# Patient Record
Sex: MALE | Race: BLACK OR AFRICAN AMERICAN | Employment: PART TIME | URBAN - METROPOLITAN AREA
[De-identification: names, ages, dates, MRNs, and addresses within clinical notes are randomized per-mention and may not be internally consistent; named-entity substitution may affect disease eponyms.]

---

## 2017-05-08 ENCOUNTER — OFFICE VISIT (OUTPATIENT)
Dept: ORTHOPEDIC SURGERY | Age: 22
End: 2017-05-08

## 2017-05-08 VITALS
SYSTOLIC BLOOD PRESSURE: 139 MMHG | TEMPERATURE: 96.6 F | WEIGHT: 166.8 LBS | BODY MASS INDEX: 23.35 KG/M2 | DIASTOLIC BLOOD PRESSURE: 84 MMHG | HEIGHT: 71 IN | HEART RATE: 85 BPM

## 2017-05-08 DIAGNOSIS — M25.562 ACUTE PAIN OF BOTH KNEES: Primary | ICD-10-CM

## 2017-05-08 DIAGNOSIS — M25.561 ACUTE PAIN OF BOTH KNEES: Primary | ICD-10-CM

## 2017-05-08 NOTE — PROGRESS NOTES
Patient: Kushal Marks                MRN: [id number]       SSN: [id number]  YOB: 1995        AGE: 23 y.o. SEX: male  There is no height or weight on file to calculate BMI. PCP: No primary care provider on file. 05/08/17      No chief complaint on file. HISTORY OF PRESENT ILLNESS: Kushal Marks is a 23 y.o. male who presents to the office for acute onset of bilateral knee pain while running during a PFT in [employer]. He had no problems prior to boot camp. He denied any swelling or locking of either knee. He still has pain despite being home form [employer] on leave. Review of Systems   Constitutional: Negative. HENT: Negative. Eyes: Negative. Respiratory: Negative. Cardiovascular: Negative. Gastrointestinal: Negative. Genitourinary: Negative. Musculoskeletal: Positive for joint pain (bilateral knees). Skin: Negative. Neurological: Negative. Endo/Heme/Allergies: Negative. Psychiatric/Behavioral: Negative. Social History     Social History    Marital status: SINGLE     Spouse name: N/A    Number of children: N/A    Years of education: N/A     Occupational History    Not on file. Social History Main Topics    Smoking status: Not on file    Smokeless tobacco: Not on file    Alcohol use Not on file    Drug use: Not on file    Sexual activity: Not on file     Other Topics Concern    Not on file     Social History Narrative        No past medical history on file. Allergies not on file      No current outpatient prescriptions on file. No current facility-administered medications for this visit. Physical Exam  Constitutional: he is oriented to person, place, and time and well-developed, well-nourished, and in no distress. No distress. HENT:   Head: Normocephalic and atraumatic.    Right Ear: Hearing normal.   Left Ear: Hearing normal.   Nose: Nose normal.   Eyes: Conjunctivae, EOM and lids are normal. Pupils are equal, round, and reactive to light. Neck: Trachea normal.   Pulmonary/Chest: Effort normal and breath sounds normal. No respiratory distress. Abdominal: Soft. Neurological: he is alert and oriented to person, place, and time. Skin: Skin is warm, dry and intact. he is not diaphoretic. Psychiatric: Affect normal.   Nursing note and vitals reviewed. Ortho Exam   Show no swelling, effusion or increased warmth. Ligaments were stable when tested but caused pain in both knee when tested. Performance of a DKB showed pain but no crepitus of either patellar femoral joint. RADIOGRAPHS:   No new XR's taken today. IMPRESSION & PLAN: The pt may have bone contusions from running. Since NSIADS (ibuprofen) have not helped, we will have him undergo an MRI to see if there is any internal deranagment. I will see him back after the results of the MRI. Written by Anthony Santiago, as dictated by Dr. Akin Hernández, Dr. Amy Jason, confirm that all documentation is accurate.

## 2017-05-16 ENCOUNTER — HOSPITAL ENCOUNTER (OUTPATIENT)
Age: 22
Discharge: HOME | End: 2017-05-16
Attending: ORTHOPAEDIC SURGERY
Payer: OTHER GOVERNMENT

## 2017-05-16 DIAGNOSIS — M25.561 ACUTE PAIN OF BOTH KNEES: ICD-10-CM

## 2017-05-16 DIAGNOSIS — M25.562 ACUTE PAIN OF BOTH KNEES: ICD-10-CM

## 2017-05-16 PROCEDURE — 73721 MRI JNT OF LWR EXTRE W/O DYE: CPT

## 2017-06-26 ENCOUNTER — OFFICE VISIT (OUTPATIENT)
Dept: ORTHOPEDIC SURGERY | Age: 22
End: 2017-06-26

## 2017-06-26 VITALS
HEIGHT: 71 IN | TEMPERATURE: 97.5 F | DIASTOLIC BLOOD PRESSURE: 76 MMHG | SYSTOLIC BLOOD PRESSURE: 123 MMHG | HEART RATE: 84 BPM | BODY MASS INDEX: 22.4 KG/M2 | WEIGHT: 160 LBS

## 2017-06-26 DIAGNOSIS — M22.2X2 PATELLOFEMORAL SYNDROME OF BOTH KNEES: ICD-10-CM

## 2017-06-26 DIAGNOSIS — M22.2X1 PATELLOFEMORAL SYNDROME OF BOTH KNEES: ICD-10-CM

## 2017-06-26 DIAGNOSIS — M25.561 ACUTE PAIN OF BOTH KNEES: Primary | ICD-10-CM

## 2017-06-26 DIAGNOSIS — M25.562 ACUTE PAIN OF BOTH KNEES: Primary | ICD-10-CM

## 2017-06-26 RX ORDER — HYDROXYZINE 50 MG/1
50 TABLET, FILM COATED ORAL
COMMUNITY
End: 2019-05-31 | Stop reason: ALTCHOICE

## 2017-06-26 NOTE — PROGRESS NOTES
Patient: Vinny Hammond                MRN: [id number]       SSN: [id number]  YOB: 1995        AGE: 23 y.o. SEX: male  There is no height or weight on file to calculate BMI. PCP: PROVIDER UNKNOWN  06/26/17      Chief Complaint   Patient presents with    Knee Pain     Bilateral       HISTORY OF PRESENT ILLNESS: Vinny Hammond is a 23 y.o. male who presents to the office for has completed the MRI of both knees. The MRI report was consistent of patellofemoral friction syndrome which I would interpret as patellofemoral syndrome. In short there was no major structural abnormality in either right or left knee. The pt states the pain is most prominent with long running or exercises involving the LE's. Review of Systems   Constitutional: Negative. HENT: Negative. Eyes: Negative. Respiratory: Negative. Cardiovascular: Negative. Gastrointestinal: Negative. Genitourinary: Negative. Musculoskeletal: Positive for joint pain (bilateral knees). Skin: Negative. Neurological: Negative. Endo/Heme/Allergies: Negative. Psychiatric/Behavioral: Negative. Social History     Social History    Marital status: SINGLE     Spouse name: N/A    Number of children: N/A    Years of education: N/A     Occupational History    Not on file. Social History Main Topics    Smoking status: Never Smoker    Smokeless tobacco: Never Used    Alcohol use No    Drug use: No    Sexual activity: Not on file     Other Topics Concern    Not on file     Social History Narrative        History reviewed. No pertinent past medical history. No Known Allergies      Current Outpatient Prescriptions   Medication Sig    hydrOXYzine HCl (ATARAX) 50 mg tablet Take 50 mg by mouth three (3) times daily as needed for Itching.  ibuprofen 100 mg tablet Take 100 mg by mouth every six (6) hours as needed for Pain. No current facility-administered medications for this visit. Physical Exam  Constitutional: he is oriented to person, place, and time and well-developed, well-nourished, and in no distress. No distress. HENT:   Head: Normocephalic and atraumatic. Right Ear: Hearing normal.   Left Ear: Hearing normal.   Nose: Nose normal.   Eyes: Conjunctivae, EOM and lids are normal. Pupils are equal, round, and reactive to light. Neck: Trachea normal.   Pulmonary/Chest: Effort normal and breath sounds normal. No respiratory distress. Abdominal: Soft. Neurological: he is alert and oriented to person, place, and time. Skin: Skin is warm, dry and intact. he is not diaphoretic. Psychiatric: Affect normal.   Nursing note and vitals reviewed. Ortho Exam   Otherwise there was no significant change in the physical exam.       RADIOGRAPHS:   No new XR's taken     IMPRESSION & PLAN: I feel that his knee pain is most consistent with patellofemoral syndrome of each knee. I reccommended heat ad/or ice when they become symptomatic. I recommended that  during his [employer] career he refrain from prolonged running and substitute that with push ups, situps and pulls up and other none impact none loading exercises. As to whether he will ever have a end to the patellofemoral syndrome is hard for me to say. I do feel he can be a productive member of [employer] if his running could be placed within certain restricted guidelines. With regards to medications, medication such as NSAIDS and Tylenol could be used long term if they benefit his pain. I will see him back prn. Written by Dileep Jacobo, as dictated by Dr. Livia Thompson, Dr. Darcy Gaucher, confirm that all documentation is accurate.

## 2017-07-26 ENCOUNTER — OFFICE VISIT (OUTPATIENT)
Dept: ORTHOPEDIC SURGERY | Age: 22
End: 2017-07-26

## 2017-07-26 VITALS
BODY MASS INDEX: 23.63 KG/M2 | OXYGEN SATURATION: 100 % | HEART RATE: 107 BPM | RESPIRATION RATE: 18 BRPM | DIASTOLIC BLOOD PRESSURE: 76 MMHG | HEIGHT: 71 IN | TEMPERATURE: 98.5 F | WEIGHT: 168.8 LBS | SYSTOLIC BLOOD PRESSURE: 150 MMHG

## 2017-07-26 DIAGNOSIS — M22.2X2 PATELLOFEMORAL SYNDROME OF BOTH KNEES: Primary | ICD-10-CM

## 2017-07-26 DIAGNOSIS — M22.2X1 PATELLOFEMORAL SYNDROME OF BOTH KNEES: Primary | ICD-10-CM

## 2017-07-26 RX ORDER — TRIAMCINOLONE ACETONIDE 40 MG/ML
40 INJECTION, SUSPENSION INTRA-ARTICULAR; INTRAMUSCULAR ONCE
Qty: 1 ML | Refills: 0
Start: 2017-07-26 | End: 2017-07-26

## 2017-07-26 RX ORDER — MELOXICAM 15 MG/1
15 TABLET ORAL
Qty: 30 TAB | Refills: 1 | Status: SHIPPED | OUTPATIENT
Start: 2017-07-26 | End: 2019-06-24

## 2017-07-26 NOTE — PATIENT INSTRUCTIONS
Patellofemoral Pain Syndrome: Care Instructions  Your Care Instructions  Patellofemoral pain syndrome is pain in the front of the knee. It is caused by overuse, weak thigh muscles (quadriceps), or a problem with the way the kneecap moves. Extra weight may also cause this syndrome. The patella is the kneecap, and the femur is the thighbone. Some people may have pain in the front of the knee from a condition called chondromalacia. In this problem, the underside of the knee cartilage wears down and frays. Cartilage is a rubbery tissue that cushions joints. In some cases, the kneecap does not move, or track, in a normal way. You may have knee pain when you run, walk down hills or steps, or do another activity. Sitting for a long time also can cause knee pain. Your knee pain may get better with medicines for pain and swelling and exercises to make your quadriceps stronger. Losing weight, if you need to, may also help with pain. Follow-up care is a key part of your treatment and safety. Be sure to make and go to all appointments, and call your doctor if you are having problems. It's also a good idea to know your test results and keep a list of the medicines you take. How can you care for yourself at home? · Ask your doctor if you can take an over-the-counter pain medicine, such as acetaminophen (Tylenol), ibuprofen (Advil, Motrin), or naproxen (Aleve). Be safe with medicines. Read and follow all instructions on the label. · Rest and protect your knee. Take a break from activities that cause pain, such as long periods of sitting or kneeling. · Put ice or a cold pack on your knee for 10 to 20 minutes after activity. Put a thin cloth between the ice and your skin. · If your doctor recommends an elastic bandage, sleeve, or other type of support for your knee, wear it as directed. · If your knee is not swollen, you can put moist heat, a heating pad, or a warm cloth on your knee.  After several days of rest, you can begin gentle exercise of your knee. · Reach and stay at a healthy weight. Being overweight puts stress on your knees. · Wear athletic shoes that offer good support, especially if you run. · Use shoe inserts, or orthotics, if they help reduce your knee pain. Many drugstores and shoe stores sell them. · See a physical therapist to learn more exercises and stretches to make your legs stronger. When should you call for help? Watch closely for changes in your health, and be sure to contact your doctor if:  · Your knee pain does not get better or gets worse. Where can you learn more? Go to http://jagdeep-elijah.info/. Enter R571 in the search box to learn more about \"Patellofemoral Pain Syndrome: Care Instructions. \"  Current as of: March 21, 2017  Content Version: 11.3  © 6253-3774 [employer]. Care instructions adapted under license by [employer] (which disclaims liability or warranty for this information). If you have questions about a medical condition or this instruction, always ask your healthcare professional. [address] any warranty or liability for your use of this information.

## 2017-07-26 NOTE — PROGRESS NOTES
Parish Torres  1995   Chief Complaint   Patient presents with    Knee Pain     Donnie        HISTORY OF PRESENT ILLNESS  Parish Torres is a 23 y.o. male who presents today for evaluation of right knee pain R>L. 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 He was seen by Dr. Maude Meneses who ordered MRIs. He has not attended any PT. He has not had previous cortisone injections. he rates his pain 7/10 today. Patient describes the pain as soreness that is Intermittent in nature. Symptoms are worse with Activity and Exercise and is better with  Rest. Associated symptoms include Swelling. Since problem started, it: is unchanged. Pain does wake patient up at night. Has not taken medication for the problem. He is requesting information regarding to cortisone injections. Has tried following treatments: Injections:NO; Brace:NO; Therapy:NO; Cane/Crutch:NO       No Known Allergies     History reviewed. No pertinent past medical history. Social History     Social History    Marital status: SINGLE     Spouse name: N/A    Number of children: N/A    Years of education: N/A     Occupational History    Not on file. Social History Main Topics    Smoking status: Never Smoker    Smokeless tobacco: Never Used    Alcohol use No    Drug use: No    Sexual activity: Not on file     Other Topics Concern    Not on file     Social History Narrative      History reviewed. No pertinent surgical history. History reviewed. No pertinent family history. Current Outpatient Prescriptions   Medication Sig    meloxicam (MOBIC) 15 mg tablet Take 1 Tab by mouth daily (with breakfast).  triamcinolone acetonide (KENALOG) 40 mg/mL injection 1 mL by IntraMUSCular route once for 1 dose.  ibuprofen 100 mg tablet Take 100 mg by mouth every six (6) hours as needed for Pain.  hydrOXYzine HCl (ATARAX) 50 mg tablet Take 50 mg by mouth three (3) times daily as needed for Itching. No current facility-administered medications for this visit. REVIEW OF SYSTEM   Patient denies: Weight loss, Fever/Chills, HA, Visual changes, Fatigue, Chest pain, SOB, Abdominal pain, N/V/D/C, Blood in stool or urine, Edema. Pertinent positive as above in HPI. All others were negative    PHYSICAL EXAM:   Visit Vitals    /76    Pulse (!) 107    Temp 98.5 °F (36.9 °C) (Oral)    Resp 18    Ht 5' 11\" (1.803 m)    Wt 168 lb 12.8 oz (76.6 kg)    SpO2 100%    BMI 23.54 kg/m2     The patient is a well-developed, well-nourished male   in no acute distress. The patient is alert and oriented times three. The patient is alert and oriented times three. Mood and affect are normal.  LYMPHATIC: lymph nodes are not enlarged and are within normal limits  SKIN: normal in color and non tender to palpation. There are no bruises or abrasions noted. NEUROLOGICAL: Motor sensory exam is within normal limits. Reflexes are equal bilaterally. There is normal sensation to pinprick and light touch  MUSCULOSKELETAL:  Examination Left knee Right knee   Skin Intact Intact   Range of motion 0-130 0-130   Effusion - -   Medial joint line tenderness - -   Lateral joint line tenderness - -   Tenderness Pes Bursa - -   Tenderness insertion MCL - -   Tenderness insertion LCL - -   Brandens - -   Patella crepitus - -   Patella grind - -   Lachman - -   Pivot shift - -   Anterior drawer - -   Posterior drawer - -   Varus stress - -   Valgus stress - -   Neurovascular Intact Intact   Calf Swelling and Tenderness to Palpation - -   Alejandrina's Test - -   Hamstring Cord Tightness + +     IMAGING:   MRI of the right knee dated 5/16/17 was reviewed and read:   IMPRESSION:   1. Findings consistent with patellar tendon lateral femoral condyle friction  syndrome  2. 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 MRI of the left knee dated 5/16/17 was reviewed and read:   IMPRESSION:   1.   Findings consistent with patellar tendon lateral femoral condyle friction  syndrome with patella rajendra, edema within the superolateral fat pad and mild patellar tendinopathy. 2.  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       IMPRESSION:      ICD-10-CM ICD-9-CM    1. Patellofemoral syndrome of both knees M22.2X1 719.46 meloxicam (MOBIC) 15 mg tablet    M22.2X2  REFERRAL TO PHYSICAL THERAPY      TRIAMCINOLONE ACETONIDE INJ      triamcinolone acetonide (KENALOG) 40 mg/mL injection      DRAIN/INJECT LARGE JOINT/BURSA        PLAN:  1. Patient experiecing continued B/L knee pain. I feel his knees will respond well to therapy. Risk factors include: none  2. Yes cortisone injection indicated today: B/L KNEES   3. Yes Physical Therapy indicated today  4. No diagnostic test indicated today  5. No durable medical equipment indicated today  6. No referral indicated today   7. Yes medications indicated today: MOBIC 15 mg  8. No Narcotic indicated today. RTC - 4 weeks  Follow-up Disposition: Not on File    Scribed by Vicente Mayes [facility]) as dictated by Dennise Snellen, MD    I, Dr. Dennise Snellen, confirm that all documentation is accurate.     Dennise Snellen, M.D.   [facility]

## 2017-07-26 NOTE — MR AVS SNAPSHOT
Visit Information Date & Time Provider Department Dept. Phone Encounter #  
 7/26/2017  2:10 PM Cecily Veliz MD [facility] [phone] Your Appointments 9/11/2017  2:50 PM  
Follow Up with Cecily Velzi MD  
[address], [address] [facility] [place] [facility] ([facility]) Appt Note: yann knee fu  
 [address], [address] [facility]  
[phone] [address] [place] [address] Upcoming Health Maintenance Date Due Hepatitis A Peds Age 1-18 (1 of 2 - Standard Series) 1/1/1998 DTaP/Tdap/Td series (1 - Tdap) 1/1/2004 HPV AGE 9Y-34Y (1 of 3 - Male 3 Dose Series) 1/1/2008 INFLUENZA AGE 9 TO ADULT 8/1/2017 Allergies as of 7/26/2017  Review Complete On: 7/26/2017 By: Cecily Veliz MD  
 No Known Allergies Current Immunizations  Never Reviewed No immunizations on file. Not reviewed this visit You Were Diagnosed With   
  
 Codes Comments Patellofemoral syndrome of both knees    -  Primary ICD-10-CM: M22.2X1, M22.2X2 ICD-9-CM: 719.46 Vitals BP Pulse Temp Resp Height(growth percentile) Weight(growth percentile) 150/76 (!) 107 98.5 °F (36.9 °C) (Oral) 18 5' 11\" (1.803 m) 168 lb 12.8 oz (76.6 kg) SpO2 BMI Smoking Status 100% 23.54 kg/m2 Never Smoker BMI and BSA Data Body Mass Index Body Surface Area  
 23.54 kg/m 2 1.96 m 2 Your Updated Medication List  
  
   
This list is accurate as of: 7/26/17 11:59 PM.  Always use your most recent med list.  
  
  
  
  
 hydrOXYzine HCl 50 mg tablet Commonly known as:  ATARAX Take 50 mg by mouth three (3) times daily as needed for Itching. ibuprofen 100 mg tablet Take 100 mg by mouth every six (6) hours as needed for Pain.  
  
 meloxicam 15 mg tablet Commonly known as:  MOBIC  
 Take 1 Tab by mouth daily (with breakfast). triamcinolone acetonide 40 mg/mL injection Commonly known as:  KENALOG  
1 mL by IntraMUSCular route once for 1 dose. Prescriptions Printed Refills  
 meloxicam (MOBIC) 15 mg tablet 1 Sig: Take 1 Tab by mouth daily (with breakfast). Class: Print Route: Oral  
  
We Performed the Following DRAIN/INJECT LARGE JOINT/BURSA [id number] CPT(R)] REFERRAL TO PHYSICAL THERAPY [ARW18 Custom] Comments:  
 Evaluate and treat for B/L knee pain. 3 times a week for 4 weeks. TRIAMCINOLONE ACETONIDE INJ [ [facility]] To-Do List   
 08/28/2017 3:00 PM  
  Appointment with Maria Esther Harrington at [facility]  [facility] [address] ([phone])  
  
 08/30/2017 2:30 PM  
  Appointment with Maria Esther Harrington at [facility]  [facility] ([phone])  
  
 09/05/2017 2:00 PM  
  Appointment with Vi Trevizo PT at [address] ([phone])  
  
 09/06/2017 2:30 PM  
  Appointment with Maria Esther Harrington at [address] ([phone])  
  
 09/11/2017 2:30 PM  
  Appointment with Vi Trevizo PT at [address] ([phone]) Referral Information Referral ID Referred By Referred To  
  
 [id number] [address] Phone: [phone] Fax: [phone] Visits Status Start Date End Date 1 New Request 7/26/17 7/26/18 If your referral has a status of pending review or denied, additional information will be sent to support the outcome of this decision. Patient Instructions Patellofemoral Pain Syndrome: Care Instructions Your Care Instructions Patellofemoral pain syndrome is pain in the front of the knee. It is caused by overuse, weak thigh muscles (quadriceps), or a problem with the way the kneecap moves. Extra weight may also cause this syndrome. The patella is the kneecap, and the femur is the thighbone. Some people may have pain in the front of the knee from a condition called chondromalacia. In this problem, the underside of the knee cartilage wears down and frays. Cartilage is a rubbery tissue that cushions joints. In some cases, the kneecap does not move, or track, in a normal way. You may have knee pain when you run, walk down hills or steps, or do another activity. Sitting for a long time also can cause knee pain. Your knee pain may get better with medicines for pain and swelling and exercises to make your quadriceps stronger. Losing weight, if you need to, may also help with pain. Follow-up care is a key part of your treatment and safety. Be sure to make and go to all appointments, and call your doctor if you are having problems. It's also a good idea to know your test results and keep a list of the medicines you take. How can you care for yourself at home? · Ask your doctor if you can take an over-the-counter pain medicine, such as acetaminophen (Tylenol), ibuprofen (Advil, Motrin), or naproxen (Aleve). Be safe with medicines. Read and follow all instructions on the label. · Rest and protect your knee. Take a break from activities that cause pain, such as long periods of sitting or kneeling. · Put ice or a cold pack on your knee for 10 to 20 minutes after activity. Put a thin cloth between the ice and your skin. · If your doctor recommends an elastic bandage, sleeve, or other type of support for your knee, wear it as directed. · If your knee is not swollen, you can put moist heat, a heating pad, or a warm cloth on your knee. After several days of rest, you can begin gentle exercise of your knee. · Reach and stay at a healthy weight. Being overweight puts stress on your knees. · Wear athletic shoes that offer good support, especially if you run. · Use shoe inserts, or orthotics, if they help reduce your knee pain.  Many drugstores and shoe stores sell them. · See a physical therapist to learn more exercises and stretches to make your legs stronger. When should you call for help? Watch closely for changes in your health, and be sure to contact your doctor if: 
· Your knee pain does not get better or gets worse. Where can you learn more? Go to http://jagdeep-elijah.info/. Enter S550 in the search box to learn more about \"Patellofemoral Pain Syndrome: Care Instructions. \" Current as of: March 21, 2017 Content Version: 11.3 © 0535-9725 [employer]. Care instructions adapted under license by [employer] (which disclaims liability or warranty for this information). If you have questions about a medical condition or this instruction, always ask your healthcare professional. [address] any warranty or liability for your use of this information. Introducing [facility]! Letty Stokes introduces [employer] patient portal. Now you can access parts of your medical record, email your doctor's office, and request medication refills online. 1. In your internet browser, go to [email or web address] 2. Click on the First Time User? Click Here link in the Sign In box. You will see the New Member Sign Up page. 3. Enter your [employer] Access Code exactly as it appears below. You will not need to use this code after youve completed the sign-up process. If you do not sign up before the expiration date, you must request a new code. · [employer] Access Code: [email or web address] Expires: 11/5/2017 12:04 PM 
 
4. Enter the last four digits of your Social Security Number (xxxx) and Date of Birth (mm/dd/yyyy) as indicated and click Submit. You will be taken to the next sign-up page. 5. Create a [employer] ID. This will be your [employer] login ID and cannot be changed, so think of one that is secure and easy to remember. 6. Create a [employer] password. You can change your password at any time. 7. Enter your Password Reset Question and Answer. This can be used at a later time if you forget your password. 8. Enter your e-mail address. You will receive e-mail notification when new information is available in [address] 9. Click Sign Up. You can now view and download portions of your medical record. 10. Click the Download Summary menu link to download a portable copy of your medical information. If you have questions, please visit the Frequently Asked Questions section of the [employer] website. Remember, [employer] is NOT to be used for urgent needs. For medical emergencies, dial 911. Now available from your iPhone and Android! Please provide this summary of care documentation to your next provider. Your primary care clinician is listed as PROVIDER UNKNOWN. If you have any questions after today's visit, please call [phone].

## 2017-08-10 ENCOUNTER — TELEPHONE (OUTPATIENT)
Dept: ORTHOPEDIC SURGERY | Age: 22
End: 2017-08-10

## 2017-08-10 NOTE — TELEPHONE ENCOUNTER
Patient's mother Angel Cuevas (on [employer]) is calling to let us know that kristina has not recvd the orders for patient's physical therapy and that it needs to be sent straigjt to kristina from the doctor's office. Patient's mom Angel Cuevas can be reached at [phone]     Ms. Osmel Hinojosa just let me know she has a form from them. She will have it dropped off in the morning.

## 2017-08-16 ENCOUNTER — HOSPITAL ENCOUNTER (OUTPATIENT)
Dept: PHYSICAL THERAPY | Age: 22
Discharge: HOME | End: 2017-08-16
Payer: OTHER GOVERNMENT

## 2017-08-16 PROCEDURE — 97161 PT EVAL LOW COMPLEX 20 MIN: CPT

## 2017-08-16 PROCEDURE — 97110 THERAPEUTIC EXERCISES: CPT

## 2017-08-16 NOTE — PROGRESS NOTES
PT DAILY TREATMENT NOTE     Patient Name: Andrew Client  Date:2017  : 1995  [x]  Patient  Verified  Payor:  / Plan: [employer] [facility] [address] AND DEPENDENTS / Product Type:  /    In time:434  Out time:507  Total Treatment Time (min): 33  Visit #: 1 of 8    Treatment Area: Bilateral knee pain [M25.561, M25.562]    SUBJECTIVE  Pain Level (0-10 scale): 6  Any medication changes, allergies to medications, adverse drug reactions, diagnosis change, or new procedure performed?: [x] No    [] Yes (see summary sheet for update)  Subjective functional status/changes:   [] No changes reported  See eval    OBJECTIVE    10 min [x]Eval                  []Re-Eval       23 min Therapeutic Exercise:  [] See flow sheet :   Rationale: increase ROM and increase strength to improve the patients ability to increase ease with daily activities          With   [] TE   [] TA   [] neuro   [] other: Patient Education: [x] Review HEP    [] Progressed/Changed HEP based on:   [] positioning   [] body mechanics   [] transfers   [] heat/ice application    [] other:      Other Objective/Functional Measures:      Pain Level (0-10 scale) post treatment: 6    ASSESSMENT/Changes in Function: see POC    Patient will continue to benefit from skilled PT services to modify and progress therapeutic interventions, address functional mobility deficits, address ROM deficits, address strength deficits, analyze and address soft tissue restrictions and analyze and cue movement patterns to attain remaining goals. []  See Plan of Care  []  See progress note/recertification  []  See Discharge Summary         Progress towards goals / Updated goals:  Short Term Goals: To be accomplished in 2 weeks:                         1. I and compliant with HEP for self management of symptoms. Long Term Goals: To be accomplished in 4 weeks:                         1. Improve FOTO to 67 to indicate improved function with daily activities. 2. Increase B hip strength to grossly 4+/5 to improve stability for running. 3. Increase B hip posterior capsule flexibility to [facility] to increase ease with prolonged standing.      PLAN  []  Upgrade activities as tolerated     []  Continue plan of care  []  Update interventions per flow sheet       []  Discharge due to:_  []  Other:_      Tania Pemberton, PT 8/16/2017  6:43 PM    Future Appointments  Date Time Provider Natalie Hui   8/21/2017 5:00 PM Nataliya Blankenship, PT [facility]   8/23/2017 1:00 PM MD Adilene Bergman [address]   8/23/2017 2:00 PM Mary Ellen Villasenor, PT [facility]   8/28/2017 3:00 PM Mechelle Ellis [facility]   8/30/2017 2:30 PM [address] [place] [address] [facility]   9/5/2017 2:00 PM Barbra Del Cid PT [facility]   9/6/2017 2:30 PM Mechelle Ellis [facility]   9/11/2017 2:30 PM Barbra Del Cid PT [facility]

## 2017-08-16 NOTE — PROGRESS NOTES
[facility]  [address]  [place], [address]  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 Assessment/ key information: 23y.o. year old male presents with CC of B knee pain that began 7 months ago during basic training camp for the [employer]. Deficits include: decreased posterior capsule mobility in B hips; decreased strength in B hips; decreased flexibility in B HS; poor core stability. Patient will benefit from physical therapy to address deficits, and ultimately to return patient to prior level of function.     Evaluation Complexity History LOW Complexity : Zero comorbidities / personal factors that will impact the outcome / POC; Examination MEDIUM Complexity : 3 Standardized tests and measures addressing body structure, function, activity limitation and / or participation in recreation  ;Presentation LOW Complexity : Stable, uncomplicated  ;Clinical Decision Making MEDIUM Complexity : FOTO score of 26-74  Overall Complexity Rating: LOW   Problem List: pain affecting function, decrease ROM, decrease strength, decrease ADL/ functional abilitiies, decrease activity tolerance and decrease flexibility/ joint mobility   Treatment Plan may include any combination of the following: Therapeutic exercise, Neuromuscular re-education, Physical agent/modality, Manual therapy and Patient education  Patient / Family readiness to learn indicated by: asking questions, trying to perform skills and interest  Persons(s) to be included in education: patient (P)  Barriers to Learning/Limitations: None  Patient Goal (s): To decrease pain so I can resume running for my physical training for [employer]  Patient Self Reported Health Status: good  Rehabilitation Potential: good    Short Term Goals: To be accomplished in 2 weeks:   1. I and compliant with HEP for self management of symptoms. Long Term Goals: To be accomplished in 4 weeks:   1. Improve FOTO to 67 to indicate improved function with daily activities. 2. Increase B hip strength to grossly 4+/5 to improve stability for running. 3. Increase B hip posterior capsule flexibility to [facility] to increase ease with prolonged standing. Frequency / Duration: Patient to be seen 2 times per week for 4 weeks. Patient/ Caregiver education and instruction: Diagnosis, prognosis, exercises   [x]  Plan of care has been reviewed with ARIELLA Cornelius, PT 8/16/2017 5:37 PM    ________________________________________________________________________    I certify that the above Therapy Services are being furnished while the patient is under my care. I agree with the treatment plan and certify that this therapy is necessary.     [de-identified] Signature:____________________  Date:____________Time: _________    Please sign and return to [facility]  [address]  [place], [address]  [phone] fax

## 2017-08-21 ENCOUNTER — HOSPITAL ENCOUNTER (OUTPATIENT)
Dept: PHYSICAL THERAPY | Age: 22
Discharge: HOME | End: 2017-08-21
Payer: OTHER GOVERNMENT

## 2017-08-21 PROCEDURE — 97140 MANUAL THERAPY 1/> REGIONS: CPT

## 2017-08-21 PROCEDURE — 97110 THERAPEUTIC EXERCISES: CPT

## 2017-08-21 NOTE — PROGRESS NOTES
PT DAILY TREATMENT NOTE 8    Patient Name: Melina Wu  Date:2017  : 1995  [x]  Patient  Verified  Payor:  / Plan: [employer] [facility] [address] AND DEPENDENTS / Product Type:  /    In time:500  Out time:548  Total Treatment Time (min): 48  Visit #: 2 of 8    Treatment Area: Bilateral knee pain [M25.561, M25.562]    SUBJECTIVE  Pain Level (0-10 scale): 6  Any medication changes, allergies to medications, adverse drug reactions, diagnosis change, or new procedure performed?: [x] No    [] Yes (see summary sheet for update)  Subjective functional status/changes:   [] No changes reported  I'm sore. No changes reported with HEP    OBJECTIVE  40 min Therapeutic Exercise:  [x] See flow sheet :   Rationale: increase ROM and increase strength to improve the patients ability to increase ease of ADLs  8 min Manual Therapy:  Post femoral glides (B)   Rationale: decrease pain, increase ROM and increase tissue extensibility to increase (B) knee extension            With   [] TE   [] TA   [] neuro   [] other: Patient Education: [x] Review HEP    [] Progressed/Changed HEP based on:   [] positioning   [] body mechanics   [] transfers   [] heat/ice application    [] other:      Other Objective/Functional Measures: Hard end feel with mobilizations. Pt was very difficult to get information from regarding symptoms. He was challenged by all exercises secondary to poor conditioning. Pain Level (0-10 scale) post treatment: 6    ASSESSMENT/Changes in Function: No change after treatment. Unable to demonstrate a hip hinge even with stick.       Patient will continue to benefit from skilled PT services to modify and progress therapeutic interventions, address functional mobility deficits, address ROM deficits, address strength deficits, analyze and address soft tissue restrictions, analyze and cue movement patterns, analyze and modify body mechanics/ergonomics and assess and modify postural abnormalities to attain remaining goals. []  See Plan of Care  []  See progress note/recertification  []  See Discharge Summary         Progress towards goals  Short Term Goals: To be accomplished in 2 weeks:                         5. I and compliant with HEP for self management of symptoms. Long Term Goals: To be accomplished in 4 weeks:                         1. Improve FOTO to 67 to indicate improved function with daily activities. 2. Increase B hip strength to grossly 4+/5 to improve stability for running. 3. Increase B hip posterior capsule flexibility to [facility] to increase ease with prolonged standing.      PLAN  []  Upgrade activities as tolerated     [x]  Continue plan of care  []  Update interventions per flow sheet       []  Discharge due to:_  []  Other:_      Nina Perez, PT 8/21/2017  5:00 PM    Future Appointments  Date Time Provider Natalie Hui   8/23/2017 1:00 PM MD Adilene Potts [address]   8/23/2017 2:00 PM Jonas Chapman, PT [facility]   8/28/2017 3:00 PM  [address] [facility]   8/30/2017 2:30 PM  [address] [facility]   9/5/2017 2:00 PM Paul Pereyra, PT [facility]   9/6/2017 2:30 PM Vernel Goodpasture [facility]   9/11/2017 2:30 PM Paul Pereyra, PT [facility]

## 2017-08-23 ENCOUNTER — HOSPITAL ENCOUNTER (OUTPATIENT)
Dept: PHYSICAL THERAPY | Age: 22
Discharge: HOME | End: 2017-08-23
Payer: OTHER GOVERNMENT

## 2017-08-23 PROCEDURE — 97140 MANUAL THERAPY 1/> REGIONS: CPT

## 2017-08-23 PROCEDURE — 97112 NEUROMUSCULAR REEDUCATION: CPT

## 2017-08-23 PROCEDURE — 97110 THERAPEUTIC EXERCISES: CPT

## 2017-08-23 NOTE — PROGRESS NOTES
PT DAILY TREATMENT NOTE     Patient Name: Rox Franco  Date:2017  : 1995  [x]  Patient  Verified  Payor:  / Plan: [employer] [facility] [address] AND DEPENDENTS / Product Type: [employer] /    In time:1:57pm  Out time:2:37pm  Total Treatment Time (min): 36   1:1 time: 28  Visit #: 3 of 8    Treatment Area: Bilateral knee pain [M25.561, M25.562]    SUBJECTIVE  Pain Level (0-10 scale): 5  Any medication changes, allergies to medications, adverse drug reactions, diagnosis change, or new procedure performed?: [x] No    [] Yes (see summary sheet for update)  Subjective functional status/changes:   [] No changes reported  Pt reports continued knee pain bilaterally when \"putting weight on it\" that he describes as\" being around the knee caps\". OBJECTIVE  24 min Therapeutic Exercise:  [x] See flow sheet :   Rationale: increase ROM and increase strength to improve the patients ability to improve ease of ADLs. 8 min Manual Therapy:  Per flow sheet   Rationale: decrease pain, increase ROM and increase tissue extensibility to improve ADL ease. 8 min Neuromuscular Re-education:  [x]  See flow sheet :   Rationale: increase strength and improve coordination  to improve the patients ability to improve ADL ease. With   [] TE   [] TA   [] neuro   [] other: Patient Education: [x] Review HEP    [] Progressed/Changed HEP based on:   [] positioning   [] body mechanics   [] transfers   [] heat/ice application    [] other:      Other Objective/Functional Measures:     Pt vague regarding symptoms, difficult to obtain information    Difficulty obtaining hip hinge performance, requires cueing from dowel wall, and manual PT cueing to start to facilitate hip mobility, but remains trunk mobility dominant    Reports knee pain with palpation around patella bilat during manual interventions     Pain Level (0-10 scale) post treatment: 5    ASSESSMENT/Changes in Function: No change after treatment.  Poor functional movement and poor functional utilization of hip mobility, unable to perform hip hinge though some improvement with extensive cueing. Patient will continue to benefit from skilled PT services to modify and progress therapeutic interventions, address functional mobility deficits, address ROM deficits, address strength deficits, analyze and address soft tissue restrictions, analyze and cue movement patterns, analyze and modify body mechanics/ergonomics and assess and modify postural abnormalities to attain remaining goals. []  See Plan of Care  []  See progress note/recertification  []  See Discharge Summary         Progress towards goals  Short Term Goals: To be accomplished in 2 weeks:                         4. I and compliant with HEP for self management of symptoms. Long Term Goals: To be accomplished in 4 weeks:                         1. Improve FOTO to 67 to indicate improved function with daily activities. 2. Increase B hip strength to grossly 4+/5 to improve stability for running. 3. Increase B hip posterior capsule flexibility to [facility] to increase ease with prolonged standing.      PLAN  []  Upgrade activities as tolerated     [x]  Continue plan of care  []  Update interventions per flow sheet       []  Discharge due to:_  []  Other:_      Aletha Telles, PT, DPT, ATC, CSCS 8/23/2017  2:02 PM    Future Appointments  Date Time Provider Natalie Hui   8/28/2017 3:00 PM [address] [place] [address]   8/30/2017 2:30 PM [address] [place] [address]   9/5/2017 2:00 PM Chuckie Duckworth PT [facility]   9/6/2017 2:30 PM Aletha Telles [facility]   9/11/2017 2:30 PM Chuckie Duckworth PT [facility]   9/11/2017 2:50 PM MD Adilene Neal 69

## 2017-08-28 ENCOUNTER — HOSPITAL ENCOUNTER (OUTPATIENT)
Dept: PHYSICAL THERAPY | Age: 22
Discharge: HOME | End: 2017-08-28
Payer: OTHER GOVERNMENT

## 2017-08-28 PROCEDURE — 97112 NEUROMUSCULAR REEDUCATION: CPT

## 2017-08-28 PROCEDURE — 97110 THERAPEUTIC EXERCISES: CPT

## 2017-08-28 PROCEDURE — 97140 MANUAL THERAPY 1/> REGIONS: CPT

## 2017-08-28 NOTE — PROGRESS NOTES
PT DAILY TREATMENT NOTE - [facility]     Patient Name: Eladia Hussein  Date:2017  : 1995  [x]  Patient  Verified  Payor:  / Plan: [facility]  ACTIVE DUTY AND DEPENDENTS / Product Type:  /    In time: 3:00pm  Out time:3:49pm  Total Treatment Time (min): 49  Total Timed Codes (min): 49  1:1 Treatment Time : 28   Visit #: 4 of 8    Treatment Area: Bilateral knee pain [M25.561, M25.562]    SUBJECTIVE  Pain Level (0-10 scale): 6  Any medication changes, allergies to medications, adverse drug reactions, diagnosis change, or new procedure performed?: [x] No    [] Yes (see summary sheet for update)  Subjective functional status/changes:   [] No changes reported  \"I tried to go for a run this weekend and that was a mistake    OBJECTIVE    33 min Therapeutic Exercise:  [x] See flow sheet :   Rationale: increase ROM, increase strength and improve coordination to improve the patients ability to improve ease of ADLs. 8 min Neuromuscular Re-education:  [x]  See flow sheet :   Rationale: increase strength and improve coordination  to improve the patients ability to improve ADL ease. 8 min Manual Therapy:  Per flow sheet   Rationale: decrease pain, increase ROM and increase tissue extensibility to improve ease of ADLs. With   [] TE   [] TA   [] neuro   [] other: Patient Education: [x] Review HEP    [] Progressed/Changed HEP based on:   [] positioning   [] body mechanics   [] transfers   [] heat/ice application    [x] other:      Other Objective/Functional Measures:     Somewhat vague regarding symptoms    Improved hip hinge performance after initial cueing with improved utilization of hip mobility and improved trunk stability    Progressed to SL RDL with dowel, fair technique with some notable instability. Improved hip flexion mobility noted grossly post manual interventions    Instability and limited hip ext excursion with single limb bridges.      Pain Level (0-10 scale) post treatment: 5    ASSESSMENT/Changes in Function: Pt demonstrates improved hip mobility grossly assessed and some improved utilization of functional hip mobility in RDLs and hip hinge. He continues to require extensive cueing for hip mobility utilization and demonstrates limited ease of stabilizing his pelvis and dynamic knee valgus/varus stability. He reports no change in symptoms post treatment, but continues to report knee pain with activity. Educated pt regarding recreational exercise and emphasizing core strength/stability. Advised to hold off on running until pain subsides. Patient will continue to benefit from skilled PT services to modify and progress therapeutic interventions, address functional mobility deficits, address ROM deficits, address strength deficits, analyze and address soft tissue restrictions, analyze and cue movement patterns, analyze and modify body mechanics/ergonomics and assess and modify postural abnormalities to attain remaining goals. []  See Plan of Care  []  See progress note/recertification  []  See Discharge Summary         Progress towards goals / Updated goals:  Short Term Goals: To be accomplished in 2 weeks:                         6. I and compliant with HEP for self management of symptoms. Long Term Goals: To be accomplished in 4 weeks:                         1. Improve FOTO to 67 to indicate improved function with daily activities. 2. Increase B hip strength to grossly 4+/5 to improve stability for running. 3. Increase B hip posterior capsule flexibility to [facility] to increase ease with prolonged standing.      PLAN  [x]  Upgrade activities as tolerated     [x]  Continue plan of care  []  Update interventions per flow sheet       []  Discharge due to:_  []  Other:_      Brandon Nevarez, PT, DPT, ATC, CSCS 8/28/2017  3:06 PM    Future Appointments  Date Time Provider Natalie Hui   8/30/2017 2:30 PM [address] [place] [address]   9/5/2017 2:00 PM Maple Renato S[facility]   9/6/2017 2:30 PM [address] [facility]   9/11/2017 2:30 PM Pasquale Hardin PT [facility]   9/11/2017 2:50 PM MD Adilene Breaux 69

## 2017-08-30 ENCOUNTER — HOSPITAL ENCOUNTER (OUTPATIENT)
Dept: PHYSICAL THERAPY | Age: 22
Discharge: HOME | End: 2017-08-30
Payer: OTHER GOVERNMENT

## 2017-08-30 PROCEDURE — 97110 THERAPEUTIC EXERCISES: CPT

## 2017-08-30 PROCEDURE — 97112 NEUROMUSCULAR REEDUCATION: CPT

## 2017-08-30 NOTE — PROGRESS NOTES
PT DAILY TREATMENT NOTE     Patient Name: Gerson Turcios  Date:2017  : 1995  [x]  Patient  Verified  Payor:  / Plan: [id number] AND DEPENDENTS / Product Type:  /    In time:2:30pm  Out time:3:34pm  Total Treatment Time (min): 59   1:1 time: 33 min  Visit #: 5 of 8    Treatment Area: Bilateral knee pain [M25.561, M25.562]    SUBJECTIVE  Pain Level (0-10 scale): 5  Any medication changes, allergies to medications, adverse drug reactions, diagnosis change, or new procedure performed?: [x] No    [] Yes (see summary sheet for update)  Subjective functional status/changes:   [] No changes reported  \"Its the same. Its a little less frequent at night, but its the same when I walk. \"    OBJECTIVE    39 min Therapeutic Exercise:  [x] See flow sheet :   Rationale: increase strength, improve coordination and reduce pain to improve the patients ability to improve ease of daily activity    25 min Neuromuscular Re-education:  [x]  See flow sheet :   Rationale: increase strength, improve coordination and increase proprioception  to improve the patients ability to improve ease of ambulatory activity.            With   [] TE   [] TA   [] neuro   [] other: Patient Education: [x] Review HEP    [] Progressed/Changed HEP based on:   [] positioning   [] body mechanics   [] transfers   [] heat/ice application    [] other:      Other Objective/Functional Measures:    Pt observed hopping to adjust position void of complaints of pain or evident dysfunction    Demonstrates fair SL RDL, though consistently with pelvic rotation compensation occasionally improving with cues    Vague regarding symptoms and response to interventions, reports \"pain\" in B hips and knees with RDLs and with Pails/Rails    Pain Level (0-10 scale) post treatment: 6    ASSESSMENT/Changes in Function: Pt reports increased pain in B hips and knees citing SL RDLs and pails rails as increasing knee pain, though he is vague as to location and nature of his pain. Despite this, he demonstrates no limitations with full deep squats, nor other interventions, though with vague reports of pain occasionally, despite cueing to avoid pain provocation and to report when his symptoms are provoked. He demonstrates improved stability with RDLs as compared to prior visits, but reports minimal change in the nature of his symptoms. If this persists, will DC NV. Patient will continue to benefit from skilled PT services to modify and progress therapeutic interventions, address functional mobility deficits, address ROM deficits, address strength deficits, analyze and address soft tissue restrictions, analyze and cue movement patterns, analyze and modify body mechanics/ergonomics and assess and modify postural abnormalities to attain remaining goals. []  See Plan of Care  []  See progress note/recertification  []  See Discharge Summary         Progress towards goals / Updated goals:  Short Term Goals: To be accomplished in 2 weeks:                         7. I and compliant with HEP for self management of symptoms. Pt reports some performance 8/30/2017  Long Term Goals: To be accomplished in 4 weeks:                         1. Improve FOTO to 67 to indicate improved function with daily activities. 2. Increase B hip strength to grossly 4+/5 to improve stability for running. 3. Increase B hip posterior capsule flexibility to [facility] to increase ease with prolonged standing.  Grossly improved 8/30/2017    PLAN  [x]  Upgrade activities as tolerated     [x]  Continue plan of care  []  Update interventions per flow sheet       []  Discharge due to:_  []  Other:_      Carlie Harding, PT, DPT, ATC, CSCS 8/30/2017  2:45 PM    Future Appointments  Date Time Provider Natalie Hui   9/5/2017 2:00 PM John Paul Stevenson PT [facility]   9/6/2017 2:30 PM Carlie Harding [facility]   9/11/2017 2:30 PM John Paul Stevenson PT [facility]   9/11/2017 2:50 PM MD Adilene Rajput

## 2017-09-06 ENCOUNTER — HOSPITAL ENCOUNTER (OUTPATIENT)
Dept: PHYSICAL THERAPY | Age: 22
Discharge: HOME | End: 2017-09-06
Payer: OTHER GOVERNMENT

## 2017-09-06 PROCEDURE — 97112 NEUROMUSCULAR REEDUCATION: CPT

## 2017-09-06 PROCEDURE — 97110 THERAPEUTIC EXERCISES: CPT

## 2017-09-06 PROCEDURE — 97530 THERAPEUTIC ACTIVITIES: CPT

## 2017-09-06 NOTE — PROGRESS NOTES
PT DAILY TREATMENT NOTE 12    Patient Name: Celso Jaramillo  Date:2017  : 1995  [x]  Patient  Verified  Payor:  / Plan: [facility] [address] AND DEPENDENTS / Product Type:  /    In time:2:30pm  Out time:3:25pm  Total Treatment Time (min): 54   1:1 time: 48  Visit #: 6 of 8    Treatment Area: Bilateral knee pain [M25.561, M25.562]    SUBJECTIVE  Pain Level (0-10 scale): 6  Any medication changes, allergies to medications, adverse drug reactions, diagnosis change, or new procedure performed?: [x] No    [] Yes (see summary sheet for update)  Subjective functional status/changes:   [] No changes reported  Pt reports \"I'm hurting today, I had to lift a bunch of heavy stuff earlier. \" Pt reports decreased severity and frequency of pain since start of care per pt report. OBJECTIVE  20 min Therapeutic Exercise:  [x] See flow sheet :   Rationale: increase ROM, increase strength and improve coordination to improve the patients ability to improve ease of ADLs. 25 min Neuromuscular Re-education:  [x]  See flow sheet : & Trial bilat patellar lift KTape   Rationale: increase strength, improve coordination and increase proprioception  to improve the patients ability to improve ease of daily activity and improve valgus knee stability and eccentric control. 10 min Therapeutic Activity:  [x]  See flow sheet :   Rationale: improve coordination and increase proprioception  to improve the patients ability to improve ease of closed chain functional activity and reduce dynamic valgus tendencies.           With   [] TE   [] TA   [] neuro   [] other: Patient Education: [x] Review HEP    [] Progressed/Changed HEP based on:   [] positioning   [] body mechanics   [] transfers   [] heat/ice application    [] other:      Other Objective/Functional Measures:   Grossly less stability on LLE compared to R during SL RDL series, some improvement noted with taactile and visual cues, continues L side pelvic rotation compensation and instability     Pain Level (0-10 scale) post treatment: 6    ASSESSMENT/Changes in Function: Pt reports minimal change in symptoms as per subjective, though his pain rating has been relatively unchanged. Continues to demonstrate limited valgus knee stability L worse than R, though some intermittent improvement noted with interventions when pt applies himself. Trialled KT patellar taping, with minimal change in symptoms with closed chain activity reported. Advised pt to trial for 3 days and assess response. Discussed potential progression to DC NV if progress remains limited. Advised pt to perform low impact activity as opposed to running for cardiovascular exercise and gave examples. Patient will continue to benefit from skilled PT services to modify and progress therapeutic interventions, address functional mobility deficits, address ROM deficits, address strength deficits, analyze and address soft tissue restrictions, analyze and cue movement patterns, analyze and modify body mechanics/ergonomics and instruct in home and community integration to attain remaining goals. []  See Plan of Care  []  See progress note/recertification  []  See Discharge Summary         Progress towards goals / Updated goals:  Short Term Goals: To be accomplished in 2 weeks:                         8. I and compliant with HEP for self management of symptoms. Pt reports some performance 8/30/2017  Long Term Goals: To be accomplished in 4 weeks:                         1. Improve FOTO to 67 to indicate improved function with daily activities. 2. Increase B hip strength to grossly 4+/5 to improve stability for running. 3. Increase B hip posterior capsule flexibility to [facility] to increase ease with prolonged standing.  Grossly improved 8/30/2017    PLAN  [x]  Upgrade activities as tolerated     [x]  Continue plan of care  []  Update interventions per flow sheet       []  Discharge due to:_  [] Other:_      Nicol Sorensen, PT, DPT, ATC, CSCS 9/6/2017  2:40 PM    Future Appointments  Date Time Provider Natalie Hui   9/11/2017 2:30 PM Gnia Quinn, PT [id number]   9/11/2017 2:50 PM [place] [id number]

## 2017-09-11 ENCOUNTER — OFFICE VISIT (OUTPATIENT)
Dept: ORTHOPEDIC SURGERY | Age: 22
End: 2017-09-11

## 2017-09-11 VITALS
WEIGHT: 165 LBS | HEIGHT: 71 IN | SYSTOLIC BLOOD PRESSURE: 133 MMHG | HEART RATE: 92 BPM | DIASTOLIC BLOOD PRESSURE: 83 MMHG | RESPIRATION RATE: 16 BRPM | OXYGEN SATURATION: 98 % | BODY MASS INDEX: 23.1 KG/M2

## 2017-09-11 DIAGNOSIS — M22.2X2 PATELLOFEMORAL SYNDROME OF BOTH KNEES: Primary | ICD-10-CM

## 2017-09-11 DIAGNOSIS — M22.2X1 PATELLOFEMORAL SYNDROME OF BOTH KNEES: Primary | ICD-10-CM

## 2017-09-11 NOTE — PATIENT INSTRUCTIONS
Patellofemoral Pain Syndrome: Care Instructions  Your Care Instructions  Patellofemoral pain syndrome is pain in the front of the knee. It is caused by overuse, weak thigh muscles (quadriceps), or a problem with the way the kneecap moves. Extra weight may also cause this syndrome. The patella is the kneecap, and the femur is the thighbone. Some people may have pain in the front of the knee from a condition called chondromalacia. In this problem, the underside of the knee cartilage wears down and frays. Cartilage is a rubbery tissue that cushions joints. In some cases, the kneecap does not move, or track, in a normal way. You may have knee pain when you run, walk down hills or steps, or do another activity. Sitting for a long time also can cause knee pain. Your knee pain may get better with medicines for pain and swelling and exercises to make your quadriceps stronger. Losing weight, if you need to, may also help with pain. Follow-up care is a key part of your treatment and safety. Be sure to make and go to all appointments, and call your doctor if you are having problems. It's also a good idea to know your test results and keep a list of the medicines you take. How can you care for yourself at home? · Ask your doctor if you can take an over-the-counter pain medicine, such as acetaminophen (Tylenol), ibuprofen (Advil, Motrin), or naproxen (Aleve). Be safe with medicines. Read and follow all instructions on the label. · Rest and protect your knee. Take a break from activities that cause pain, such as long periods of sitting or kneeling. · Put ice or a cold pack on your knee for 10 to 20 minutes after activity. Put a thin cloth between the ice and your skin. · If your doctor recommends an elastic bandage, sleeve, or other type of support for your knee, wear it as directed. · If your knee is not swollen, you can put moist heat, a heating pad, or a warm cloth on your knee.  After several days of rest, you can begin gentle exercise of your knee. · Reach and stay at a healthy weight. Being overweight puts stress on your knees. · Wear athletic shoes that offer good support, especially if you run. · Use shoe inserts, or orthotics, if they help reduce your knee pain. Many drugstores and shoe stores sell them. · See a physical therapist to learn more exercises and stretches to make your legs stronger. When should you call for help? Watch closely for changes in your health, and be sure to contact your doctor if:  · Your knee pain does not get better or gets worse. Where can you learn more? Go to http://jagdeep-elijah.info/. Enter W313 in the search box to learn more about \"Patellofemoral Pain Syndrome: Care Instructions. \"  Current as of: March 21, 2017  Content Version: 11.3  © 0465-1611 Healthwise, Incorporated. Care instructions adapted under license by [employer] (which disclaims liability or warranty for this information). If you have questions about a medical condition or this instruction, always ask your healthcare professional. [address] any warranty or liability for your use of this information.

## 2017-09-11 NOTE — PROGRESS NOTES
Marylu Begum  1995   Chief Complaint   Patient presents with    Knee Pain     bilateral        HISTORY OF PRESENT ILLNESS  Marylu Begum is a 23 y.o. male who presents today for reevaluation of B/L knee pain. At last OV, patient referred for PT, and provided prescription for Mobic. He rates his pain 5/10 today. He has attended about 9 sessions of PT with some improvement. He is still complaining of constant soreness, especially with walking. He reports having some relief from previous cortisone injection. Patient denies any fever, chills, chest pain, shortness of breath or calf pain. There are no new illness or injuries to report since last seen in the office. There are no changes to medications, allergies, family or social history. PHYSICAL EXAM:   Visit Vitals    /83 (BP 1 Location: Left arm, BP Patient Position: Sitting)    Pulse 92    Resp 16    Ht 5' 11\" (1.803 m)    Wt 165 lb (74.8 kg)    SpO2 98%    BMI 23.01 kg/m2     The patient is a well-developed, well-nourished male   in no acute distress. The patient is alert and oriented times three. The patient is alert and oriented times three. Mood and affect are normal.  LYMPHATIC: lymph nodes are not enlarged and are within normal limits  SKIN: normal in color and non tender to palpation. There are no bruises or abrasions noted. NEUROLOGICAL: Motor sensory exam is within normal limits. Reflexes are equal bilaterally.  There is normal sensation to pinprick and light touch  MUSCULOSKELETAL:  Examination Left knee Right knee   Skin Intact Intact   Range of motion 0-130 0-130   Effusion - -   Medial joint line tenderness - -   Lateral joint line tenderness - -   Tenderness Pes Bursa - -   Tenderness insertion MCL - -   Tenderness insertion LCL - -   Brandens - -   Patella crepitus - -   Patella grind - -   Lachman - -   Pivot shift - -   Anterior drawer - -   Posterior drawer - -   Varus stress - -   Valgus stress - -   Neurovascular Intact Intact   Calf Swelling and Tenderness to Palpation - -   Alejandrina's Test - -   Hamstring Cord Tightness + +     IMAGING:   MRI of the right knee dated 5/16/17 was reviewed and read:   IMPRESSION:   1.  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     MRI of the left knee dated 5/16/17 was reviewed and read:   IMPRESSION:   1.  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 2.  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        IMPRESSION:      ICD-10-CM ICD-9-CM    1. Patellofemoral syndrome of both knees M22.2X1 719.46 REFERRAL TO PHYSICAL THERAPY    M22.2X2          PLAN:   1. Patient's B/L knee pain continues despite previous injection and PT. I would like him to continue with PT. Risk factors include: n/a  2. No cortisone injection indicated today   3. Yes continue Physical Therapy indicated today  4. No diagnostic test indicated today  5. No durable medical equipment indicated today  6. No referral indicated today   7. No medications indicated today  8. No Narcotic indicated today    RTC 4 weeks  Follow-up Disposition: Not on File    Scribed by Carole Lau [facility]) as dictated by Selvin Vo MD    I, Dr. Selvin Vo, confirm that all documentation is accurate.     Selvin Vo M.D.   [facility]

## 2017-09-18 ENCOUNTER — HOSPITAL ENCOUNTER (OUTPATIENT)
Dept: PHYSICAL THERAPY | Age: 22
Discharge: HOME | End: 2017-09-18
Payer: OTHER GOVERNMENT

## 2017-09-18 PROCEDURE — 97110 THERAPEUTIC EXERCISES: CPT

## 2017-09-18 PROCEDURE — 97112 NEUROMUSCULAR REEDUCATION: CPT

## 2017-09-18 NOTE — PROGRESS NOTES
PT DAILY TREATMENT NOTE     Patient Name: Yuli Dotson  Date:2017  : 1995  [x]  Patient  Verified  Payor:  / Plan: [facility]  ACTIVE DUTY AND DEPENDENTS / Product Type:  /    In time:3:30pm  Out time:4:18pm  Total Treatment Time (min): 48  Visit #: 7 of 8    Treatment Area: Bilateral knee pain [M25.561, M25.562]    SUBJECTIVE  Pain Level (0-10 scale): 6  Any medication changes, allergies to medications, adverse drug reactions, diagnosis change, or new procedure performed?: [x] No    [] Yes (see summary sheet for update)  Subjective functional status/changes:   [] No changes reported  \"It's the same. \" Pt reports continued ant knee pain bilat (points vaguely around B patellae). He reports he f/u with his orthopaedic. He denies new imaging, denies any other further treatment. Reports some HEP compliance, though he admits to no performance yesterday. Reports minimal improvement with KT tape trial, but reports skin discomfort from \"Pulling\". OBJECTIVE    23 min Therapeutic Exercise:  [x] See flow sheet :   Rationale: increase ROM, increase strength and improve coordination to improve the patients ability to improve ease of daily activity. 25 min Neuromuscular Re-education:  [x]  See flow sheet :   Rationale: increase strength, improve coordination and increase proprioception  to improve the patients ability to improve ease of daily activity.         With   [] TE   [] TA   [] neuro   [] other: Patient Education: [x] Review HEP    [] Progressed/Changed HEP based on:   [] positioning   [] body mechanics   [] transfers   [] heat/ice application    [x] other:  HEP update per handout     Other Objective/Functional Measures:     Hip abd strength: 5/5 bilat     90/90 HS: R -17, L -22    Limited bilat pelvic stability    Pain Level (0-10 scale) post treatment: 6    ASSESSMENT/Changes in Function: Pt reports no significant change in knee pain at this time, reporting continued increase in vague reports of knee pain with activity. He demonstrates improved HS flexibility, hip abd strength, and hip mobility, but reports no carryover into function or pain control, despite modifications and changes to interventions. He does continue to demonstrate poor hip stability during unilateral standing activity, but has demonstrated minimal to no improvement despite interventions. At this time, pt has reached a plateau with therapy. He has been advised to continue an updated HEP and to f/u with his orthopaedic in 4 weeks if symptoms continue to remain unchanged. Patient will continue to benefit from skilled PT services to modify and progress therapeutic interventions, address functional mobility deficits, address ROM deficits, address strength deficits, analyze and address soft tissue restrictions, analyze and cue movement patterns, analyze and modify body mechanics/ergonomics and instruct in home and community integration to attain remaining goals. []  See Plan of Care  []  See progress note/recertification  []  See Discharge Summary         Progress towards goals / Updated goals:  Short Term Goals: To be accomplished in 2 weeks:                         7. I and compliant with HEP for self management of symptoms. Pt reports some performance 8/30/2017  Long Term Goals: To be accomplished in 4 weeks:                         1. Improve FOTO to 67 to indicate improved function with daily activities. Not assessed, pt left without performing 9/18/2017  2. Increase B hip strength to grossly 4+/5 to improve stability for running. Met 5/5 bilat 9/18/2017  3. Increase B hip posterior capsule flexibility to [facility] [place] to increase ease with prolonged standing.  Grossly improved 8/30/2017    PLAN   []  Upgrade activities as tolerated     []  Continue plan of care  []  Update interventions per flow sheet       [x]  Discharge due to:_No carryover into function or pain control per pt reports despite objective improvement in associated impairments.   []  Other:_      Raulito Peaks, PT, DPT, ATC, CSCS 9/18/2017  3:37 PM    Future Appointments  Date Time Provider Natalie Hui   9/20/2017 3:30 PM [address] [place] [address]

## 2017-09-20 ENCOUNTER — APPOINTMENT (OUTPATIENT)
Dept: PHYSICAL THERAPY | Age: 22
End: 2017-09-20
Payer: OTHER GOVERNMENT

## 2017-09-26 NOTE — PROGRESS NOTES
[facility] [address]  [place], [address]  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 I and compliant with HEP for self management of symptoms. Pt reports some performance 2017  Long Term Goals: To be accomplished in 4 weeks:                         1. Improve FOTO to 67 to indicate improved function with daily activities. Not assessed, pt left without performing 2017  2. Increase B hip strength to grossly 4+/5 to improve stability for running. Met 5/5 bilat 2017  3. Increase B hip posterior [facility] to increase ease with prolonged standing. Grossly improved 2017    ASSESSMENT/RECOMMENDATIONS: Pt reports no significant change in knee pain at this time, reporting continued increase in vague reports of knee pain with activity. He demonstrates improved HS flexibility, hip abd strength, and hip mobility, but reports no carryover into function or pain control, despite modifications and changes to interventions. He does continue to demonstrate poor hip stability during unilateral standing activity, but has demonstrated minimal to no improvement despite interventions. At this time, pt has reached a plateau with therapy.  He has been advised to continue an updated HEP and to f/u with his orthopaedic in 4 weeks if symptoms continue to remain unchanged.     [x]Discontinue therapy: []Patient has reached or is progressing toward set goals      []Patient is non-compliant or has abdicated      [x]Due to lack of appreciable progress towards set goals    Ca Leggett, PT, DPT, ATC, CSCS 9/26/2017 1:41 PM

## 2017-12-26 ENCOUNTER — OFFICE VISIT (OUTPATIENT)
Dept: ORTHOPEDIC SURGERY | Age: 22
End: 2017-12-26

## 2017-12-26 VITALS
HEART RATE: 94 BPM | HEIGHT: 71 IN | SYSTOLIC BLOOD PRESSURE: 137 MMHG | TEMPERATURE: 94.1 F | OXYGEN SATURATION: 100 % | RESPIRATION RATE: 18 BRPM | WEIGHT: 165 LBS | DIASTOLIC BLOOD PRESSURE: 81 MMHG | BODY MASS INDEX: 23.1 KG/M2

## 2017-12-26 DIAGNOSIS — M22.2X1 PATELLOFEMORAL SYNDROME OF BOTH KNEES: Primary | ICD-10-CM

## 2017-12-26 DIAGNOSIS — M22.2X2 PATELLOFEMORAL SYNDROME OF BOTH KNEES: Primary | ICD-10-CM

## 2017-12-26 NOTE — LETTER
NOTIFICATION RETURN TO WORK / SCHOOL 
 
12/26/2017 10:35 AM 
 
Mr. Abelino Trammell Joseline To Whom It May Concern: 
 
Abelino Trammell is currently under the care of [address] [place], [place] [address]. He is to be placed on restricted duty status, no running x 5 months. If there are questions or concerns please have the patient contact our office. Sincerely, Boyd Morrison MD

## 2017-12-26 NOTE — PROGRESS NOTES
Stoney Russo  1995   Chief Complaint   Patient presents with    Knee Pain     Bilateral        HISTORY OF PRESENT ILLNESS  Stoney Russo is a 23 y.o. male who presents today for reevaluation of B/L knee pain. Patient has been attending PT which has helped a little bit. He rates his pain 6/10 today. He works for [employer]. He needed a follow up today to renew his waivers for work. He is still complaining of constant soreness, especially with running. Patient denies any fever, chills, chest pain, shortness of breath or calf pain. There are no new illness or injuries to report since last seen in the office. There are no changes to medications, allergies, family or social history. PHYSICAL EXAM:   Visit Vitals    /81    Pulse 94    Temp (!) 94.1 °F (34.5 °C) (Oral)    Resp 18    Ht 5' 11\" (1.803 m)    Wt 165 lb (74.8 kg)    SpO2 100%    BMI 23.01 kg/m2     The patient is a well-developed, well-nourished male   in no acute distress. The patient is alert and oriented times three. The patient is alert and oriented times three. Mood and affect are normal.  LYMPHATIC: lymph nodes are not enlarged and are within normal limits  SKIN: normal in color and non tender to palpation. There are no bruises or abrasions noted. NEUROLOGICAL: Motor sensory exam is within normal limits. Reflexes are equal bilaterally.  There is normal sensation to pinprick and light touch  MUSCULOSKELETAL:  Examination Left knee Right knee   Skin Intact Intact   Range of motion 0-130 0-130   Effusion - -   Medial joint line tenderness - -   Lateral joint line tenderness - -   Tenderness Pes Bursa - -   Tenderness insertion MCL - -   Tenderness insertion LCL - -   Brandens - -   Patella crepitus - -   Patella grind - -   Lachman - -   Pivot shift - -   Anterior drawer - -   Posterior drawer - -   Varus stress - -   Valgus stress - -   Neurovascular Intact Intact   Calf Swelling and Tenderness to Palpation - -   Alejandrina's Test - -   Hamstring Cord Tightness + +     IMAGING:   MRI of the right knee dated 5/16/17 was reviewed and read:   IMPRESSION:   1.  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     MRI of the left knee dated 5/16/17 was reviewed and read:   IMPRESSION:   1.  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 2.  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      IMPRESSION:      ICD-10-CM ICD-9-CM    1. Patellofemoral syndrome of both knees M22.2X1 719.46     M22.2X2          PLAN:   1. Patient is requesting a waiver today. He would like to avoid running on the pavement and be excused to work out in the gym instead. Risk factors include: n/a  2. No cortisone injection indicated today   3. No continue Physical Therapy indicated today  4. No diagnostic test indicated today  5. No durable medical equipment indicated today  6. No referral indicated today   7. No medications indicated today  8. No Narcotic indicated today    RTC prn  Follow-up Disposition: Not on File    Scribed by Nadine Pollard [address]) as dictated by Micha Cat MD    I, Dr. Micha Cat, confirm that all documentation is accurate.     Micha Cat M.D.   [facility]

## 2018-12-19 ENCOUNTER — OFFICE VISIT (OUTPATIENT)
Dept: FAMILY MEDICINE CLINIC | Age: 23
End: 2018-12-19

## 2018-12-19 ENCOUNTER — HOSPITAL ENCOUNTER (OUTPATIENT)
Dept: LAB | Age: 23
Discharge: HOME | End: 2018-12-19
Payer: OTHER GOVERNMENT

## 2018-12-19 VITALS
TEMPERATURE: 98.5 F | SYSTOLIC BLOOD PRESSURE: 128 MMHG | RESPIRATION RATE: 16 BRPM | BODY MASS INDEX: 22.45 KG/M2 | DIASTOLIC BLOOD PRESSURE: 82 MMHG | OXYGEN SATURATION: 99 % | WEIGHT: 156.8 LBS | HEIGHT: 70 IN | HEART RATE: 91 BPM

## 2018-12-19 DIAGNOSIS — G47.00 INSOMNIA, UNSPECIFIED TYPE: ICD-10-CM

## 2018-12-19 DIAGNOSIS — G47.00 INSOMNIA, UNSPECIFIED TYPE: Primary | ICD-10-CM

## 2018-12-19 LAB
ALBUMIN SERPL-MCNC: 4.8 G/DL (ref 3.4–5)
ALBUMIN/GLOB SERPL: 1.5 {RATIO} (ref 0.8–1.7)
ALP SERPL-CCNC: 118 U/L (ref 45–117)
ALT SERPL-CCNC: 28 U/L (ref 16–61)
ANION GAP SERPL CALC-SCNC: 6 MMOL/L (ref 3–18)
AST SERPL-CCNC: 16 U/L (ref 15–37)
BILIRUB SERPL-MCNC: 1 MG/DL (ref 0.2–1)
BUN SERPL-MCNC: 12 MG/DL (ref 7–18)
BUN/CREAT SERPL: 13 (ref 12–20)
CALCIUM SERPL-MCNC: 9.4 MG/DL (ref 8.5–10.1)
CHLORIDE SERPL-SCNC: 104 MMOL/L (ref 100–108)
CO2 SERPL-SCNC: 28 MMOL/L (ref 21–32)
CREAT SERPL-MCNC: 0.93 MG/DL (ref 0.6–1.3)
GLOBULIN SER CALC-MCNC: 3.1 G/DL (ref 2–4)
GLUCOSE SERPL-MCNC: 88 MG/DL (ref 74–99)
POTASSIUM SERPL-SCNC: 4.7 MMOL/L (ref 3.5–5.5)
PROT SERPL-MCNC: 7.9 G/DL (ref 6.4–8.2)
SODIUM SERPL-SCNC: 138 MMOL/L (ref 136–145)
TSH SERPL DL<=0.05 MIU/L-ACNC: 0.83 UIU/ML (ref 0.36–3.74)

## 2018-12-19 PROCEDURE — 80053 COMPREHEN METABOLIC PANEL: CPT

## 2018-12-19 PROCEDURE — 36415 COLL VENOUS BLD VENIPUNCTURE: CPT

## 2018-12-19 PROCEDURE — 84443 ASSAY THYROID STIM HORMONE: CPT

## 2018-12-19 RX ORDER — TRAZODONE HYDROCHLORIDE 50 MG/1
50 TABLET ORAL
Qty: 30 TAB | Refills: 0 | Status: SHIPPED | OUTPATIENT
Start: 2018-12-19 | End: 2019-01-16 | Stop reason: SDUPTHER

## 2018-12-19 NOTE — PROGRESS NOTES
HISTORY OF PRESENT ILLNESS  Whit Wang is a 26 y.o. male p  Patient presents today to establish care with mother at side. Patient states he has been having trouble sleeping since graduating high school 3 years ago. He has tried otc sleeping aides without improvement. Patient states he will try to go to sleep approximately 8pm will not fall asleep until midnight. Will wake up around 10 am.  Patient reports he does not snore. Mother states a lot of people in the family have insomnia. No Known Allergies  Current Outpatient Medications   Medication Sig Dispense Refill    ibuprofen 100 mg tablet Take 100 mg by mouth every six (6) hours as needed for Pain.  meloxicam (MOBIC) 15 mg tablet Take 1 Tab by mouth daily (with breakfast). 30 Tab 1    hydrOXYzine HCl (ATARAX) 50 mg tablet Take 50 mg by mouth three (3) times daily as needed for Itching. History reviewed. No pertinent past medical history. Social History     Socioeconomic History    Marital status: SINGLE     Spouse name: Not on file    Number of children: Not on file    Years of education: Not on file    Highest education level: Not on file   Social Needs    Financial resource strain: Not on file    Food insecurity - worry: Not on file    Food insecurity - inability: Not on file    Transportation needs - medical: Not on file   [place] needs - non-medical: Not on file   Occupational History    Not on file   Tobacco Use    Smoking status: Current Some Day Smoker     Packs/day: 0.10    Smokeless tobacco: Never Used   Substance and Sexual Activity    Alcohol use: Yes     Alcohol/week: 0.6 oz     Types: 1 Glasses of wine per week    Drug use: No    Sexual activity: No   Other Topics Concern    Not on file   Social History Narrative    Not on file     Review of Systems   Constitutional: Negative for chills and fever. Respiratory: Negative for cough and shortness of breath. Cardiovascular: Negative for chest pain. Psychiatric/Behavioral: The patient has insomnia. /82 (BP 1 Location: Left arm, BP Patient Position: Sitting)   Pulse 91   Temp 98.5 °F (36.9 °C) (Oral)   Resp 16   Ht 5' 10.08\" (1.78 m)   Wt 156 lb 12.8 oz (71.1 kg)   SpO2 99%   BMI 22.45 kg/m²   Physical Exam   Constitutional: He appears well-developed and well-nourished. No distress. HENT:   Head: Normocephalic and atraumatic. Neck: Normal range of motion. Neck supple. Cardiovascular: Normal rate, regular rhythm and normal heart sounds. Exam reveals no gallop and no friction rub. No murmur heard. Pulmonary/Chest: Effort normal and breath sounds normal. He has no wheezes. He has no rhonchi. He has no rales. Abdominal: Soft. Bowel sounds are normal. There is no tenderness. Musculoskeletal: He exhibits no edema. Lymphadenopathy:     He has no cervical adenopathy. ASSESSMENT and PLAN    ICD-10-CM ICD-9-CM    1. Insomnia, unspecified type G47.00 780.52 TSH 3RD GENERATION      METABOLIC PANEL, COMPREHENSIVE      REFERRAL TO NEUROLOGY   anticipatory guidance for above provided and reviewed   I have discussed the diagnosis with the patient and the intended plan as seen in the above orders. The patient has received an after-visit summary and questions were answered concerning future plans. I have discussed medication side effects and warnings with the patient as well. Patient agreeable with above plan and verbalizes understanding. Follow-up Disposition:  Return in about 4 weeks (around 1/16/2019) for insomnia.

## 2018-12-19 NOTE — PATIENT INSTRUCTIONS
Learning About Sleeping Well  What does sleeping well mean? Sleeping well means getting enough sleep. How much sleep is enough varies among people. The number of hours you sleep is not as important as how you feel when you wake up. If you do not feel refreshed, you probably need more sleep. Another sign of not getting enough sleep is feeling tired during the day. The average total nightly sleep time is 7½ to 8 hours. Healthy adults may need a little more or a little less than this. Why is getting enough sleep important? Getting enough quality sleep is a basic part of good health. When your sleep suffers, your mood and your thoughts can suffer too. You may find yourself feeling more grumpy or stressed. Not getting enough sleep also can lead to serious problems, including injury, accidents, anxiety, and depression. What might cause poor sleeping? Many things can cause sleep problems, including:  · Stress. Stress can be caused by fear about a single event, such as giving a speech. Or you may have ongoing stress, such as worry about work or school. · Depression, anxiety, and other mental or emotional conditions. · Changes in your sleep habits or surroundings. This includes changes that happen where you sleep, such as noise, light, or sleeping in a different bed. It also includes changes in your sleep pattern, such as having jet lag or working a late shift. · Health problems, such as pain, breathing problems, and restless legs syndrome. · Lack of regular exercise. How can you help yourself? Here are some tips that may help you sleep more soundly and wake up feeling more refreshed. Your sleeping area  · Use your bedroom only for sleeping and sex. A bit of light reading may help you fall asleep. But if it doesn't, do your reading elsewhere in the house. Don't watch TV in bed. · Be sure your bed is big enough to stretch out comfortably, especially if you have a sleep partner.   · Keep your bedroom quiet, dark, and cool. Use curtains, blinds, or a sleep mask to block out light. To block out noise, use earplugs, soothing music, or a \"white noise\" machine. Your evening and bedtime routine  · Create a relaxing bedtime routine. You might want to take a warm shower or bath, listen to soothing music, or drink a cup of noncaffeinated tea. · Go to bed at the same time every night. And get up at the same time every morning, even if you feel tired. What to avoid  · Limit caffeine (coffee, tea, caffeinated sodas) during the day, and don't have any for at least 4 to 6 hours before bedtime. · Don't drink alcohol before bedtime. Alcohol can cause you to wake up more often during the night. · Don't smoke or use tobacco, especially in the evening. Nicotine can keep you awake. · Don't take naps during the day, especially close to bedtime. · Don't lie in bed awake for too long. If you can't fall asleep, or if you wake up in the middle of the night and can't get back to sleep within 15 minutes or so, get out of bed and go to another room until you feel sleepy. · Don't take medicine right before bed that may keep you awake or make you feel hyper or energized. Your doctor can tell you if your medicine may do this and if you can take it earlier in the day. If you can't sleep  · Imagine yourself in a peaceful, pleasant scene. Focus on the details and feelings of being in a place that is relaxing. · Get up and do a quiet or boring activity until you feel sleepy. · Don't drink any liquids after 6 p.m. if you wake up often because you have to go to the bathroom. Where can you learn more? Go to http://jagdeep-elijah.info/. Enter C482 in the search box to learn more about \"Learning About Sleeping Well. \"  Current as of: December 7, 2017  Content Version: 11.8  © 3225-8668 Healthwise, [email or web address].  Care instructions adapted under license by [employer] (which disclaims liability or warranty for this information). If you have questions about a medical condition or this instruction, always ask your healthcare professional. [address] any warranty or liability for your use of this information. Insomnia: Care Instructions  Your Care Instructions    Insomnia is the inability to sleep well. It is a common problem for most people at some time. Insomnia may make it hard for you to get to sleep, stay asleep, or sleep as long as you need to. This can make you tired and grouchy during the day. It can also make you forgetful, less effective at work, and unhappy. Insomnia can be caused by conditions such as depression or anxiety. Pain can also affect your ability to sleep. When these problems are solved, the insomnia usually clears up. But sometimes bad sleep habits can cause insomnia. If insomnia is affecting your work or your enjoyment of life, you can take steps to improve your sleep. Follow-up care is a key part of your treatment and safety. Be sure to make and go to all appointments, and call your doctor if you are having problems. It's also a good idea to know your test results and keep a list of the medicines you take. How can you care for yourself at home? What to avoid  · Do not have drinks with caffeine, such as coffee or black tea, for 8 hours before bed. · Do not smoke or use other types of tobacco near bedtime. Nicotine is a stimulant and can keep you awake. · Avoid drinking alcohol late in the evening, because it can cause you to wake in the middle of the night. · Do not eat a big meal close to bedtime. If you are hungry, eat a light snack. · Do not drink a lot of water close to bedtime, because the need to urinate may wake you up during the night. · Do not read or watch TV in bed. Use the bed only for sleeping and sexual activity. What to try  · Go to bed at the same time every night, and wake up at the same time every morning.  Do not take naps during the day.  · Keep your bedroom quiet, dark, and cool. · Sleep on a comfortable pillow and mattress. · If watching the clock makes you anxious, turn it facing away from you so you cannot see the time. · If you worry when you lie down, start a worry book. Well before bedtime, write down your worries, and then set the book and your concerns aside. · Try meditation or other relaxation techniques before you go to bed. · If you cannot fall asleep, get up and go to another room until you feel sleepy. Do something relaxing. Repeat your bedtime routine before you go to bed again. · Make your house quiet and calm about an hour before bedtime. Turn down the lights, turn off the TV, log off the computer, and turn down the volume on music. This can help you relax after a busy day. When should you call for help? Watch closely for changes in your health, and be sure to contact your doctor if:    · Your efforts to improve your sleep do not work.     · Your insomnia gets worse.     · You have been feeling down, depressed, or hopeless or have lost interest in things that you usually enjoy. Where can you learn more? Go to http://jagdeep-elijah.info/. Enter P513 in the search box to learn more about \"Insomnia: Care Instructions. \"  Current as of: June 29, 2018  Content Version: 11.8  © 8888-6475 Healthwise[employer]. Care instructions adapted under license by [employer] (which disclaims liability or warranty for this information). If you have questions about a medical condition or this instruction, always ask your healthcare professional. [place] [address] any warranty or liability for your use of this information.

## 2019-01-16 ENCOUNTER — OFFICE VISIT (OUTPATIENT)
Dept: FAMILY MEDICINE CLINIC | Age: 24
End: 2019-01-16

## 2019-01-16 VITALS
WEIGHT: 159.8 LBS | TEMPERATURE: 98.5 F | SYSTOLIC BLOOD PRESSURE: 137 MMHG | BODY MASS INDEX: 22.88 KG/M2 | OXYGEN SATURATION: 100 % | DIASTOLIC BLOOD PRESSURE: 87 MMHG | HEIGHT: 70 IN | HEART RATE: 96 BPM | RESPIRATION RATE: 16 BRPM

## 2019-01-16 DIAGNOSIS — G47.00 INSOMNIA, UNSPECIFIED TYPE: Primary | ICD-10-CM

## 2019-01-16 RX ORDER — TRAZODONE HYDROCHLORIDE 50 MG/1
50 TABLET ORAL
Qty: 30 TAB | Refills: 2 | Status: SHIPPED | OUTPATIENT
Start: 2019-01-16 | End: 2019-04-05 | Stop reason: SDUPTHER

## 2019-01-16 NOTE — PROGRESS NOTES
Pt is here for 4 week f/u insomnia    1. Have you been to the ER, urgent care clinic since your last visit? Hospitalized since your last visit? No    2. Have you seen or consulted any other health care providers outside of the [address] [place] [address] since your last visit? Include any pap smears or colon screening.  No

## 2019-01-16 NOTE — PATIENT INSTRUCTIONS

## 2019-01-16 NOTE — PROGRESS NOTES
HISTORY OF PRESENT ILLNESS  Celso Jaramillo is a 27 y.o. male. Presents for follow up for insomnia. Patient was prescribed trazodone. States he has been able to sleep, taking every night. Denies any adverse effects. Does not have appt with neurologist as of yet. Reports has been getting 8-9hrs of sleep each night. No Known Allergies  Current Outpatient Medications   Medication Sig Dispense Refill    traZODone (DESYREL) 50 mg tablet Take 1 Tab by mouth nightly. 30 Tab 0    ibuprofen 100 mg tablet Take 100 mg by mouth every six (6) hours as needed for Pain.  meloxicam (MOBIC) 15 mg tablet Take 1 Tab by mouth daily (with breakfast). 30 Tab 1    hydrOXYzine HCl (ATARAX) 50 mg tablet Take 50 mg by mouth three (3) times daily as needed for Itching. No past medical history on file. Social History     Socioeconomic History    Marital status: SINGLE     Spouse name: Not on file    Number of children: Not on file    Years of education: Not on file    Highest education level: Not on file   Social Needs    Financial resource strain: Not on file    Food insecurity - worry: Not on file    Food insecurity - inability: Not on file    Transportation needs - medical: Not on file   [place] needs - non-medical: Not on file   Occupational History    Not on file   Tobacco Use    Smoking status: Current Some Day Smoker     Packs/day: 0.10    Smokeless tobacco: Never Used   Substance and Sexual Activity    Alcohol use: Yes     Alcohol/week: 0.6 oz     Types: 1 Glasses of wine per week    Drug use: No    Sexual activity: No   Other Topics Concern    Not on file   Social History Narrative    Not on file         Review of Systems   Constitutional: Negative for chills and fever. Neurological: Negative for dizziness and headaches. Psychiatric/Behavioral: The patient has insomnia (improved with trazodone ).     /87 (BP 1 Location: Left arm)   Pulse 96   Temp 98.5 °F (36.9 °C) (Oral) Resp 16   Ht 5' 10.08\" (1.78 m)   Wt 159 lb 12.8 oz (72.5 kg)   SpO2 100%   BMI 22.88 kg/m²   Physical Exam   Constitutional: He is oriented to person, place, and time. He appears well-developed and well-nourished. No distress. HENT:   Head: Normocephalic and atraumatic. Neck: Normal range of motion. Neck supple. Cardiovascular: Normal rate, regular rhythm and normal heart sounds. Exam reveals no gallop and no friction rub. No murmur heard. Pulmonary/Chest: Effort normal and breath sounds normal. He has no wheezes. He has no rhonchi. He has no rales. Musculoskeletal: He exhibits no edema. Neurological: He is alert and oriented to person, place, and time. Skin: Skin is warm and dry. ASSESSMENT and PLAN    ICD-10-CM ICD-9-CM    1. Insomnia, unspecified type G47.00 780.52      Orders Placed This Encounter    traZODone (DESYREL) 50 mg tablet   above improving, refilled trazodone as per request  I have discussed the diagnosis with the patient and the intended plan as seen in the above orders. The patient has received an after-visit summary and questions were answered concerning future plans. I have discussed medication side effects and warnings with the patient as well. Patient agreeable with above plan and verbalizes understanding. Follow-up Disposition:  Return in about 3 months (around 4/16/2019) for insomnia.

## 2019-01-17 DIAGNOSIS — G47.00 INSOMNIA, UNSPECIFIED TYPE: Primary | ICD-10-CM

## 2019-04-05 ENCOUNTER — OFFICE VISIT (OUTPATIENT)
Dept: FAMILY MEDICINE CLINIC | Age: 24
End: 2019-04-05

## 2019-04-05 VITALS
HEART RATE: 106 BPM | BODY MASS INDEX: 22.5 KG/M2 | HEIGHT: 70 IN | WEIGHT: 157.2 LBS | RESPIRATION RATE: 16 BRPM | SYSTOLIC BLOOD PRESSURE: 136 MMHG | TEMPERATURE: 99.3 F | DIASTOLIC BLOOD PRESSURE: 87 MMHG | OXYGEN SATURATION: 100 %

## 2019-04-05 DIAGNOSIS — G47.00 INSOMNIA, UNSPECIFIED TYPE: ICD-10-CM

## 2019-04-05 DIAGNOSIS — R50.9 FEVER AND CHILLS: ICD-10-CM

## 2019-04-05 DIAGNOSIS — J09.X2 INFLUENZA A (H5N1): Primary | ICD-10-CM

## 2019-04-05 LAB
QUICKVUE INFLUENZA TEST: POSITIVE
VALID INTERNAL CONTROL?: YES

## 2019-04-05 RX ORDER — PROMETHAZINE HYDROCHLORIDE AND CODEINE PHOSPHATE 6.25; 1 MG/5ML; MG/5ML
5 SOLUTION ORAL
Qty: 120 ML | Refills: 0 | Status: SHIPPED | OUTPATIENT
Start: 2019-04-05 | End: 2019-04-08

## 2019-04-05 RX ORDER — OSELTAMIVIR PHOSPHATE 75 MG/1
75 CAPSULE ORAL 2 TIMES DAILY
Qty: 10 CAP | Refills: 0 | Status: SHIPPED | OUTPATIENT
Start: 2019-04-05 | End: 2019-04-10

## 2019-04-05 RX ORDER — TRAZODONE HYDROCHLORIDE 50 MG/1
50 TABLET ORAL
Qty: 30 TAB | Refills: 2 | Status: SHIPPED | OUTPATIENT
Start: 2019-04-05 | End: 2019-06-24 | Stop reason: SDUPTHER

## 2019-04-05 NOTE — PROGRESS NOTES
HISTORY OF PRESENT ILLNESS  Stoney Russo is a 27 y.o. male. Cold Symptoms   The history is provided by the patient and parent. This is a new problem. The current episode started 2 days ago. The problem has not changed since onset. There has been a fever of 101 - 101.9 F. Associated symptoms include chills, headaches (temporal ), rhinorrhea, sore throat, myalgias and nausea. Pertinent negatives include no chest pain, no ear congestion, no ear pain and no shortness of breath. No Known Allergies  Current Outpatient Medications   Medication Sig Dispense Refill    traZODone (DESYREL) 50 mg tablet Take 1 Tab by mouth nightly. 30 Tab 2    ibuprofen 100 mg tablet Take 100 mg by mouth every six (6) hours as needed for Pain.  meloxicam (MOBIC) 15 mg tablet Take 1 Tab by mouth daily (with breakfast). 30 Tab 1    hydrOXYzine HCl (ATARAX) 50 mg tablet Take 50 mg by mouth three (3) times daily as needed for Itching. No past medical history on file. Social History     Socioeconomic History    Marital status: SINGLE     Spouse name: Not on file    Number of children: Not on file    Years of education: Not on file    Highest education level: Not on file   Occupational History    Not on file   Social Needs    Financial resource strain: Not on file    Food insecurity:     Worry: Not on file     Inability: Not on file    Transportation needs:     Medical: Not on file     Non-medical: Not on file   Tobacco Use    Smoking status: Current Some Day Smoker     Packs/day: 0.10    Smokeless tobacco: Never Used   Substance and Sexual Activity    Alcohol use:  Yes     Alcohol/week: 0.6 oz     Types: 1 Glasses of wine per week    Drug use: No    Sexual activity: Never   Lifestyle    Physical activity:     Days per week: Not on file     Minutes per session: Not on file    Stress: Not on file   Relationships    Social connections:     Talks on phone: Not on file     Gets together: Not on file     Attends Gnosticist service: Not on file     Active member of club or organization: Not on file     Attends meetings of clubs or organizations: Not on file     Relationship status: Not on file    Intimate partner violence:     Fear of current or ex partner: Not on file     Emotionally abused: Not on file     Physically abused: Not on file     Forced sexual activity: Not on file   Other Topics Concern    Not on file   Social History Narrative    Not on file     Review of Systems   Constitutional: Positive for chills and fever. HENT: Positive for congestion, rhinorrhea, sinus pain and sore throat. Negative for ear pain. Respiratory: Positive for cough and sputum production (yellow). Negative for shortness of breath. Cardiovascular: Negative for chest pain and palpitations. Gastrointestinal: Positive for nausea. Musculoskeletal: Positive for myalgias. Neurological: Positive for headaches (temporal ). /87 (BP 1 Location: Left arm)   Pulse (!) 106   Temp 99.3 °F (37.4 °C) (Oral)   Resp 16   Ht 5' 10.08\" (1.78 m)   Wt 157 lb 3.2 oz (71.3 kg)   SpO2 100%   BMI 22.51 kg/m²   Physical Exam   Constitutional: He appears well-developed and well-nourished. He has a sickly appearance. HENT:   Head: Normocephalic and atraumatic. Right Ear: A middle ear effusion is present. Left Ear: A middle ear effusion is present. Nose: Mucosal edema present. Right sinus exhibits no maxillary sinus tenderness and no frontal sinus tenderness. Left sinus exhibits no maxillary sinus tenderness and no frontal sinus tenderness. Mouth/Throat: Uvula is midline and mucous membranes are normal. Posterior oropharyngeal erythema (mild) present. Neck: Normal range of motion. Neck supple. Cardiovascular: Normal rate, regular rhythm and normal heart sounds. Exam reveals no gallop and no friction rub. No murmur heard. Pulmonary/Chest: Effort normal and breath sounds normal. He has no wheezes. He has no rhonchi.  He has no rales. Lymphadenopathy:     He has cervical adenopathy (shotty). ASSESSMENT and PLAN    ICD-10-CM ICD-9-CM    1. Influenza A (H5N1) J09. X2 488.02 oseltamivir (TAMIFLU) 75 mg capsule      promethazine-codeine (PHENERGAN WITH CODEINE) 6.25-10 mg/5 mL syrup   2. Fever and chills R50.9 780.60 AMB POC RAPID INFLUENZA TEST   3. Insomnia, unspecified type G47.00 780.52 traZODone (DESYREL) 50 mg tablet     Orders Placed This Encounter    AMB POC RAPID INFLUENZA TEST    traZODone (DESYREL) 50 mg tablet    oseltamivir (TAMIFLU) 75 mg capsule    promethazine-codeine (PHENERGAN WITH CODEINE) 6.25-10 mg/5 mL syrup     alarm signs when to seek emergent care provided and reviewed   I have discussed the diagnosis with the patient and the intended plan as seen in the above orders. The patient has received an after-visit summary and questions were answered concerning future plans. I have discussed medication side effects and warnings with the patient as well. Patient agreeable with above plan and verbalizes understanding. Follow-up and Dispositions    · Return if symptoms worsen or fail to improve.

## 2019-04-05 NOTE — LETTER
NOTIFICATION RETURN TO WORK 
 
4/5/2019 12:35 PM 
 
Mr. Lucía Garland [address] To Whom It May Concern: 
 
Lucía Garland is currently under the care of [address]. He will return to work on: 4/12/19 or sooner if improved If there are questions or concerns please have the patient contact our office.  
 
 
 
Sincerely, 
 
 
Zak Zarco NP

## 2019-04-05 NOTE — PATIENT INSTRUCTIONS
Influenza (Flu): Care Instructions  Your Care Instructions    Influenza (flu) is an infection in the lungs and breathing passages. It is caused by the influenza virus. There are different strains, or types, of the flu virus from year to year. Unlike the common cold, the flu comes on suddenly and the symptoms, such as a cough, congestion, fever, chills, fatigue, aches, and pains, are more severe. These symptoms may last up to 10 days. Although the flu can make you feel very sick, it usually doesn't cause serious health problems. Home treatment is usually all you need for flu symptoms. But your doctor may prescribe antiviral medicine to prevent other health problems, such as pneumonia, from developing. Older people and those who have a long-term health condition, such as lung disease, are most at risk for having pneumonia or other health problems. Follow-up care is a key part of your treatment and safety. Be sure to make and go to all appointments, and call your doctor if you are having problems. It's also a good idea to know your test results and keep a list of the medicines you take. How can you care for yourself at home? · Get plenty of rest.  · Drink plenty of fluids, enough so that your urine is light yellow or clear like water. If you have kidney, heart, or liver disease and have to limit fluids, talk with your doctor before you increase the amount of fluids you drink. · Take an over-the-counter pain medicine if needed, such as acetaminophen (Tylenol), ibuprofen (Advil, Motrin), or naproxen (Aleve), to relieve fever, headache, and muscle aches. Read and follow all instructions on the label. No one younger than 20 should take aspirin. It has been linked to Reye syndrome, a serious illness. · Do not smoke. Smoking can make the flu worse. If you need help quitting, talk to your doctor about stop-smoking programs and medicines. These can increase your chances of quitting for good.   · Breathe moist air from a hot shower or from a sink filled with hot water to help clear a stuffy nose. · Before you use cough and cold medicines, check the label. These medicines may not be safe for young children or for people with certain health problems. · If the skin around your nose and lips becomes sore, put some petroleum jelly on the area. · To ease coughing:  ? Drink fluids to soothe a scratchy throat. ? Suck on cough drops or plain hard candy. ? Take an over-the-counter cough medicine that contains dextromethorphan to help you get some sleep. Read and follow all instructions on the label. ? Raise your head at night with an extra pillow. This may help you rest if coughing keeps you awake. · Take any prescribed medicine exactly as directed. Call your doctor if you think you are having a problem with your medicine. To avoid spreading the flu  · Wash your hands regularly, and keep your hands away from your face. · Stay home from school, work, and other public places until you are feeling better and your fever has been gone for at least 24 hours. The fever needs to have gone away on its own without the help of medicine. · Ask people living with you to talk to their doctors about preventing the flu. They may get antiviral medicine to keep from getting the flu from you. · To prevent the flu in the future, get a flu vaccine every fall. Encourage people living with you to get the vaccine. · Cover your mouth when you cough or sneeze. When should you call for help? Call 911 anytime you think you may need emergency care.  For example, call if:    · You have severe trouble breathing.    Call your doctor now or seek immediate medical care if:    · You have new or worse trouble breathing.     · You seem to be getting much sicker.     · You feel very sleepy or confused.     · You have a new or higher fever.     · You get a new rash.    Watch closely for changes in your health, and be sure to contact your doctor if:    · You begin to get better and then get worse.     · You are not getting better after 1 week. Where can you learn more? Go to http://jagdeep-elijah.info/. Enter Y978 in the search box to learn more about \"Influenza (Flu): Care Instructions. \"  Current as of: September 5, 2018  Content Version: 11.9  © 6400-6844 [employer]. Care instructions adapted under license by [employer] (which disclaims liability or warranty for this information). If you have questions about a medical condition or this instruction, always ask your healthcare professional. [place] [address] any warranty or liability for your use of this information.

## 2019-05-31 ENCOUNTER — OFFICE VISIT (OUTPATIENT)
Dept: ORTHOPEDIC SURGERY | Facility: CLINIC | Age: 24
End: 2019-05-31

## 2019-05-31 VITALS
RESPIRATION RATE: 12 BRPM | DIASTOLIC BLOOD PRESSURE: 70 MMHG | HEIGHT: 71 IN | TEMPERATURE: 98.7 F | OXYGEN SATURATION: 100 % | SYSTOLIC BLOOD PRESSURE: 131 MMHG | WEIGHT: 165.2 LBS | HEART RATE: 86 BPM | BODY MASS INDEX: 23.13 KG/M2

## 2019-05-31 DIAGNOSIS — M22.2X1 PATELLOFEMORAL PAIN SYNDROME OF RIGHT KNEE: ICD-10-CM

## 2019-05-31 DIAGNOSIS — M22.2X2 PATELLOFEMORAL PAIN SYNDROME OF LEFT KNEE: Primary | ICD-10-CM

## 2019-05-31 DIAGNOSIS — M22.42 CHONDROMALACIA OF BOTH PATELLAE: ICD-10-CM

## 2019-05-31 DIAGNOSIS — M22.41 CHONDROMALACIA OF BOTH PATELLAE: ICD-10-CM

## 2019-05-31 RX ORDER — BETAMETHASONE SODIUM PHOSPHATE AND BETAMETHASONE ACETATE 3; 3 MG/ML; MG/ML
6 INJECTION, SUSPENSION INTRA-ARTICULAR; INTRALESIONAL; INTRAMUSCULAR; SOFT TISSUE ONCE
Qty: 0.5 ML | Refills: 0
Start: 2019-05-31 | End: 2019-05-31

## 2019-05-31 NOTE — LETTER
5/31/2019 2:58 PM 
 
Mr. Cristal Vázquez [address] PLEASE EXCUSE THE ABOVE PATIENT FROM PT TEST DUE TO CHRONIC KNEE PAIN.  
 
 
Sincerely, 
 
 
Jaylin Lozada MD

## 2019-05-31 NOTE — PROGRESS NOTES
Patient: Kalyn Waters                MRN: [id number]       SSN: [id number]  YOB: 1995        AGE: 27 y.o. SEX: male    PCP: Tiburcio Earl NP  05/31/19    Chief Complaint   Patient presents with    Knee Pain     bilateral    New Patient     referred by self     HISTORY:  Kalyn Waters is a 27 y.o. male who is seen for bilateral knee pain. He has been experiencing increasing knee pain for the past 2 years. There is no history of injury. He notes that running in [employer] training caused his knee pains. He was previously seen by Dr. Celso Clement and Dr. Balta Zuniga. He was treated non surgically for patellofemoral pain syndrome. He tried to push through his pain after a previous physical test assessment. He notes mostly anterior knee pain. He notes that he is unable to even walk long distances due to his knee pain. He notes pain with standing, walking, and stair climbing. He experiences some startup pain after sitting. He rides a bike for exercise since he has difficulty running. He is concerned about an upcoming PT test. His father and sister both have chronic knee pain and patellofemoral knee pain. Pain Assessment  5/31/2019   Location of Pain Knee   Location Modifiers Left;Right   Severity of Pain 7   Quality of Pain Aching; Sharp   Duration of Pain Persistent   Frequency of Pain Constant   Aggravating Factors -   Limiting Behavior Yes   Relieving Factors -   Result of Injury No   Type of Injury -   Type of Injury Comment -     Occupation, etc:  Mr. Cay Meigs is an  in the [employer]. He lives with his parents in [place]. He is present with his mother. Mr. Cay Meigs weighs 165 lbs and is 5'11\" tall.        No results found for: HBA1C, HGBE8, ACD3COPX, VJU2PMFC, JLA7EUER  Weight Metrics 5/31/2019 4/5/2019 1/16/2019 12/19/2018 12/26/2017 9/11/2017 7/26/2017   Weight 165 lb 3.2 oz 157 lb 3.2 oz 159 lb 12.8 oz 156 lb 12.8 oz 165 lb 165 lb 168 lb 12.8 oz   BMI 23.04 kg/m2 22.51 kg/m2 22.88 kg/m2 22.45 kg/m2 23.01 kg/m2 23.01 kg/m2 23.54 kg/m2       There is no problem list on file for this patient. REVIEW OF SYSTEMS: All Below are Negative except: See HPI   Constitutional: negative for fever, chills, and weight loss. Cardiovascular: negative for chest pain, claudication, leg swelling, SOB, WILLIAMSON   Gastrointestinal: Negative for pain, N/V/C/D, Blood in stool or urine, dysuria, hematuria, incontinence, pelvic pain. Musculoskeletal: See HPI   Neurological: Negative for dizziness and weakness. Negative for headaches, Visual changes, confusion, seizures   Phychiatric/Behavioral: Negative for depression, memory loss, substance abuse. Extremities: Negative for hair changes, rash, or skin lesion changes. Hematologic: Negative for bleeding problems, bruising, pallor or swollen lymph nodes   Peripheral Vascular: No calf pain, no circulation deficits. Social History     Socioeconomic History    Marital status: SINGLE     Spouse name: Not on file    Number of children: Not on file    Years of education: Not on file    Highest education level: Not on file   Occupational History    Not on file   Social Needs    Financial resource strain: Not on file    Food insecurity:     Worry: Not on file     Inability: Not on file    Transportation needs:     Medical: Not on file     Non-medical: Not on file   Tobacco Use    Smoking status: Never Smoker    Smokeless tobacco: Never Used   Substance and Sexual Activity    Alcohol use:  Yes     Alcohol/week: 0.6 oz     Types: 1 Glasses of wine per week    Drug use: No    Sexual activity: Never   Lifestyle    Physical activity:     Days per week: Not on file     Minutes per session: Not on file    Stress: Not on file   Relationships    Social connections:     Talks on phone: Not on file     Gets together: Not on file     Attends Spiritism service: Not on file     Active member of club or organization: Not on file     Attends meetings of clubs or organizations: Not on file     Relationship status: Not on file    Intimate partner violence:     Fear of current or ex partner: Not on file     Emotionally abused: Not on file     Physically abused: Not on file     Forced sexual activity: Not on file   Other Topics Concern    Not on file   Social History Narrative    Not on file      No Known Allergies   Current Outpatient Medications   Medication Sig    betamethasone (CELESTONE SOLUSPAN) 6 mg/mL injection 1 mL by Intra artICUlar route once for 1 dose.  traZODone (DESYREL) 50 mg tablet Take 1 Tab by mouth nightly.  ibuprofen 100 mg tablet Take 800 mg by mouth every eight (8) hours as needed for Pain.  meloxicam (MOBIC) 15 mg tablet Take 1 Tab by mouth daily (with breakfast). (Patient not taking: Reported on 5/31/2019)     No current facility-administered medications for this visit. PHYSICAL EXAMINATION:  Visit Vitals  /70   Pulse 86   Temp 98.7 °F (37.1 °C) (Oral)   Resp 12   Ht 5' 11\" (1.803 m)   Wt 165 lb 3.2 oz (74.9 kg)   SpO2 100%   BMI 23.04 kg/m²      ORTHO EXAMINATION:  Examination Right knee Left knee   Skin Intact Intact   Range of motion 130-0 130-0   Effusion - -   Medial joint line tenderness + anterior  + anterior    Lateral joint line tenderness - -   Popliteal tenderness - -   Osteophytes palpable + medial  + medial    Brandens - -   Patella crepitus + +   Anterior drawer - -   Lateral laxity - -   Medial laxity - -   Varus deformity - -   Valgus deformity - -   Pretibial edema - -   Calf tenderness - -     MRI RIGHT KNEE 5/16/17 [facility]  IMPRESSION:   1. 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 MRI LEFT KNEE 5/16/17 [facility]  IMPRESSION:   1.   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 2.  Mild trochlear dysplasia. Grade I chondromalacia of the inferolateral retropatellar cartilage which may be  related to the patellar tendon lateral femoral condyle friction syndrome. RADIOGRAPHS:  XR KASANDRA KNEE 5/8/17 [facility]  IMPRESSION:  Three views - No fractures, no effusion, no joint space narrowing, no osteophytes present. Kellgren Kyler grade 0    IMPRESSION:      ICD-10-CM ICD-9-CM    1. Patellofemoral pain syndrome of left knee M22.2X2 719.46 betamethasone (CELESTONE SOLUSPAN) 6 mg/mL injection      BETAMETHASONE ACETATE & SODIUM PHOSPHATE INJECTION 3 MG EA.      DRAIN/INJECT LARGE JOINT/BURSA   2. Patellofemoral pain syndrome of right knee M22.2X1 719.46 betamethasone (CELESTONE SOLUSPAN) 6 mg/mL injection      BETAMETHASONE ACETATE & SODIUM PHOSPHATE INJECTION 3 MG EA.      DRAIN/INJECT LARGE JOINT/BURSA   3. Chondromalacia of both patellae M22.41 717.7 betamethasone (CELESTONE SOLUSPAN) 6 mg/mL injection    M22.42  BETAMETHASONE ACETATE & SODIUM PHOSPHATE INJECTION 3 MG EA.      DRAIN/INJECT LARGE JOINT/BURSA     PLAN:  [place] do physical [place] He was provided a note excusing him from the running portion of his PT test.  He will follow up as needed. There is no need for surgery at this time. Addendum:  Answers to [employer] memorandum received 6/6/19  1. Exact diagnosis: See above  2. Treatment plan: See above  3. Expected duration of treatment: Unknown  4. Estimated time to full recovery: Unknown  5. Any /civilian duty restrictions and the duration of each restriction  3 months--see letter provided  6. Medications: None  7.  Fitness restrictions: No walk/run component of PT test X 3 months  Scribed by Ysabel Gong ([address]) as dictated by Christopher Tam MD

## 2019-05-31 NOTE — LETTER
6/3/2019 3:33 PM 
 
Mr. Baljeet Dahl [address] PLEASE EXCUSE THE ABOVE PATIENT FROM THE WALK-RUN COMPONENT OF HIS PT TEST FOR THE NEXT THREE MONTHS DUE TO KNEE PAIN.  
 
 
 
Sincerely, 
 
 
 
Edie Rivas MD 
 
 MD at bedside.

## 2019-06-06 ENCOUNTER — DOCUMENTATION ONLY (OUTPATIENT)
Dept: ORTHOPEDIC SURGERY | Facility: CLINIC | Age: 24
End: 2019-06-06

## 2019-06-06 NOTE — PROGRESS NOTES
Pt dropped off letter from [employer] with specific questions--Dr Irene Cartagena made an addendum to office note to address questions--pt informed ready at [facility] location

## 2019-06-24 ENCOUNTER — OFFICE VISIT (OUTPATIENT)
Dept: FAMILY MEDICINE CLINIC | Age: 24
End: 2019-06-24

## 2019-06-24 VITALS
HEART RATE: 94 BPM | HEIGHT: 71 IN | TEMPERATURE: 98.7 F | WEIGHT: 160.4 LBS | SYSTOLIC BLOOD PRESSURE: 137 MMHG | BODY MASS INDEX: 22.46 KG/M2 | RESPIRATION RATE: 16 BRPM | DIASTOLIC BLOOD PRESSURE: 81 MMHG | OXYGEN SATURATION: 100 %

## 2019-06-24 DIAGNOSIS — G47.00 INSOMNIA, UNSPECIFIED TYPE: Primary | ICD-10-CM

## 2019-06-24 RX ORDER — TRAZODONE HYDROCHLORIDE 50 MG/1
50 TABLET ORAL
Qty: 30 TAB | Refills: 5 | Status: SHIPPED | OUTPATIENT
Start: 2019-06-24 | End: 2019-12-22 | Stop reason: SDUPTHER

## 2019-06-24 NOTE — PROGRESS NOTES
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 1. Have you been to the ER, urgent care clinic since your last visit? Hospitalized since your last visit? No    2. Have you seen or consulted any other health care providers outside of the [facility] since your last visit? Include any pap smears or colon screening.  No

## 2019-06-24 NOTE — LETTER
6/24/2019 10:33 AM 
 
Mr. Royer Peterson [address] To Whom It May Concern: 
 
Royer Peterson is currently under the care of [address]. He has been diagnosed with insomnia. Treatment plan includes taking trazodone 100 mg nightly. Duration of treatment currently is lifelong, will adjust treatment plan if medically/clinically necessary. Recovery time is not indicated. He has no restrictions to his [employer] duties or fitness restrictions. Also please note patient was treated with meloxicam in 7/2017 for knee pain by orthopedist.  Patient has not taken medication since 9/2017 which patient previously reported but had not been removed from his medication list until today's visit. If there are questions or concerns please have the patient contact our office.  
 
 
 
Sincerely, 
 
 
Christine Benoit NP

## 2019-06-24 NOTE — PROGRESS NOTES
HISTORY OF PRESENT ILLNESS  Linda Stanley is a 27 y.o. male. HPI   Patient presents today to follow up on insomnia. States trazodone continue to work well. Getting approx. 8 hrs of sleep each night. Denies any adverse effects with taking medication. Reports he needs a letter for the [employer] with his dx, treatment plan, duration of treatment, time of recovery, and any restrictions. Further reports he received a similar letter for the medication meloxicam which he has not taken in 2 years. Would like a statement reporting he has not taken medication in 2yrs. No Known Allergies  Current Outpatient Medications   Medication Sig Dispense Refill    traZODone (DESYREL) 50 mg tablet Take 1 Tab by mouth nightly. 30 Tab 2    ibuprofen 100 mg tablet Take 800 mg by mouth every eight (8) hours as needed for Pain. No past medical history on file. Social History     Socioeconomic History    Marital status: SINGLE     Spouse name: Not on file    Number of children: Not on file    Years of education: Not on file    Highest education level: Not on file   Occupational History    Not on file   Social Needs    Financial resource strain: Not on file    Food insecurity:     Worry: Not on file     Inability: Not on file    Transportation needs:     Medical: Not on file     Non-medical: Not on file   Tobacco Use    Smoking status: Never Smoker    Smokeless tobacco: Never Used   Substance and Sexual Activity    Alcohol use:  Yes     Alcohol/week: 0.6 oz     Types: 1 Glasses of wine per week    Drug use: No    Sexual activity: Never   Lifestyle    Physical activity:     Days per week: Not on file     Minutes per session: Not on file    Stress: Not on file   Relationships    Social connections:     Talks on phone: Not on file     Gets together: Not on file     Attends Zoroastrian service: Not on file     Active member of club or organization: Not on file     Attends meetings of clubs or organizations: Not on file     Relationship status: Not on file    Intimate partner violence:     Fear of current or ex partner: Not on file     Emotionally abused: Not on file     Physically abused: Not on file     Forced sexual activity: Not on file   Other Topics Concern    Not on file   Social History Narrative    Not on file     Wt Readings from Last 3 Encounters:   06/24/19 160 lb 6.4 oz (72.8 kg)   05/31/19 165 lb 3.2 oz (74.9 kg)   04/05/19 157 lb 3.2 oz (71.3 kg)     BP Readings from Last 3 Encounters:   06/24/19 137/81   05/31/19 131/70   04/05/19 136/87     Review of Systems   Constitutional: Negative for chills and fever. Respiratory: Negative for shortness of breath. Cardiovascular: Negative for chest pain and palpitations. Psychiatric/Behavioral: The patient has insomnia (controlled). /81 (BP 1 Location: Left arm)   Pulse 94   Temp 98.7 °F (37.1 °C) (Oral)   Resp 16   Ht 5' 11\" (1.803 m)   Wt 160 lb 6.4 oz (72.8 kg)   SpO2 100%   BMI 22.37 kg/m²     Physical Exam   Constitutional: He appears well-developed and well-nourished. No distress. Neck: Normal range of motion. Neck supple. Cardiovascular: Normal rate, regular rhythm and normal heart sounds. Exam reveals no gallop. No murmur heard. Pulmonary/Chest: Effort normal and breath sounds normal. He has no wheezes. He has no rhonchi. He has no rales. Lymphadenopathy:     He has no cervical adenopathy. ASSESSMENT and PLAN    ICD-10-CM ICD-9-CM    1. Insomnia, unspecified type G47.00 780.52      Letter provided  Above stable continue current treatment plan   I have discussed the diagnosis with the patient and the intended plan as seen in the above orders. The patient has received an after-visit summary and questions were answered concerning future plans. I have discussed medication side effects and warnings with the patient as well. Patient agreeable with above plan and verbalizes understanding.     Follow-up and Dispositions    · Return in about 6 months (around 12/24/2019) for insomnia .

## 2019-07-26 ENCOUNTER — OFFICE VISIT (OUTPATIENT)
Dept: FAMILY MEDICINE CLINIC | Age: 24
End: 2019-07-26

## 2019-07-26 VITALS
DIASTOLIC BLOOD PRESSURE: 85 MMHG | BODY MASS INDEX: 22.57 KG/M2 | WEIGHT: 161.2 LBS | OXYGEN SATURATION: 99 % | SYSTOLIC BLOOD PRESSURE: 126 MMHG | HEIGHT: 71 IN | TEMPERATURE: 98.5 F | HEART RATE: 90 BPM | RESPIRATION RATE: 16 BRPM

## 2019-07-26 DIAGNOSIS — R51.9 TEMPORAL HEADACHE: Primary | ICD-10-CM

## 2019-07-26 RX ORDER — BUTALBITAL, ACETAMINOPHEN AND CAFFEINE 50; 325; 40 MG/1; MG/1; MG/1
1 TABLET ORAL
Qty: 40 TAB | Refills: 0 | Status: SHIPPED | OUTPATIENT
Start: 2019-07-26 | End: 2021-01-06 | Stop reason: ALTCHOICE

## 2019-07-26 RX ORDER — ONDANSETRON 8 MG/1
8 TABLET, ORALLY DISINTEGRATING ORAL
Qty: 20 TAB | Refills: 0 | Status: SHIPPED | OUTPATIENT
Start: 2019-07-26 | End: 2021-01-06 | Stop reason: ALTCHOICE

## 2019-07-26 NOTE — LETTER
NOTIFICATION RETURN TO WORK  
 
7/26/2019 2:48 PM 
 
Mr. Hal Daniel [address] [facility] To Whom It May Concern: 
 
Hal [facility]. He will return to work on: 7/30/19, please excuse previous absences from 7/24/19-7/25/19 If there are questions or concerns please have the patient contact our office.  
 
 
 
Sincerely, 
 
 
Nighat Loera NP

## 2019-07-26 NOTE — PROGRESS NOTES
Pt is here for headache & nausea x 2 days. Pt got smallpox & anthrax vaccines 1 week ago. 1. Have you been to the ER, urgent care clinic since your last visit? Hospitalized since your last visit? No    2. Have you seen or consulted any other health care providers outside of the [address] [place] [address] since your last visit? Include any pap smears or colon screening.  No

## 2019-07-26 NOTE — PROGRESS NOTES
HISTORY OF PRESENT ILLNESS  Hal Daniel is a 27 y.o. male. HPI   Patient states he has had a headache associated with nausea, photophobia and phonophobia. Has tried ibuprofen 800 mg without improvement. Denies dizziness. Reports he woke up with headache yesterday morning. Described as squeezing sensation to bilateral temporal region. No Known Allergies  Current Outpatient Medications   Medication Sig Dispense Refill    traZODone (DESYREL) 50 mg tablet Take 1 Tab by mouth nightly. 30 Tab 5    ibuprofen 100 mg tablet Take 800 mg by mouth every eight (8) hours as needed for Pain. No past medical history on file. Social History     Socioeconomic History    Marital status: SINGLE     Spouse name: Not on file    Number of children: Not on file    Years of education: Not on file    Highest education level: Not on file   Occupational History    Not on file   Social Needs    Financial resource strain: Not on file    Food insecurity:     Worry: Not on file     Inability: Not on file    Transportation needs:     Medical: Not on file     Non-medical: Not on file   Tobacco Use    Smoking status: Never Smoker    Smokeless tobacco: Never Used   Substance and Sexual Activity    Alcohol use:  Yes     Alcohol/week: 1.0 standard drinks     Types: 1 Glasses of wine per week    Drug use: No    Sexual activity: Never   Lifestyle    Physical activity:     Days per week: Not on file     Minutes per session: Not on file    Stress: Not on file   Relationships    Social connections:     Talks on phone: Not on file     Gets together: Not on file     Attends Tenriism service: Not on file     Active member of club or organization: Not on file     Attends meetings of clubs or organizations: Not on file     Relationship status: Not on file    Intimate partner violence:     Fear of current or ex partner: Not on file     Emotionally abused: Not on file     Physically abused: Not on file     Forced sexual activity: Not on file   Other Topics Concern    Not on file   Social History Narrative    Not on file     Wt Readings from Last 3 Encounters:   07/26/19 161 lb 3.2 oz (73.1 kg)   06/24/19 160 lb 6.4 oz (72.8 kg)   05/31/19 165 lb 3.2 oz (74.9 kg)     BP Readings from Last 3 Encounters:   07/26/19 126/85   06/24/19 137/81   05/31/19 131/70     Review of Systems   Constitutional: Negative for chills and fever. Eyes: Positive for photophobia. Respiratory: Negative for shortness of breath. Cardiovascular: Negative for chest pain and palpitations. Gastrointestinal: Positive for nausea. Negative for vomiting. Neurological: Positive for headaches. Negative for dizziness. /85 (BP 1 Location: Left arm)   Pulse 90   Temp 98.5 °F (36.9 °C) (Oral)   Resp 16   Ht 5' 11\" (1.803 m)   Wt 161 lb 3.2 oz (73.1 kg)   SpO2 99%   BMI 22.48 kg/m²      Physical Exam   Constitutional: He is oriented to person, place, and time. He appears well-developed and well-nourished. HENT:   Head: Normocephalic and atraumatic. Neck: Normal range of motion. Neck supple. Cardiovascular: Normal rate, regular rhythm and normal heart sounds. Exam reveals no gallop and no friction rub. No murmur heard. Pulmonary/Chest: Effort normal and breath sounds normal. He has no wheezes. He has no rhonchi. He has no rales. Musculoskeletal: He exhibits no edema. Neurological: He is alert and oriented to person, place, and time. Skin: Skin is warm and dry. ASSESSMENT and PLAN    ICD-10-CM ICD-9-CM    1. Temporal headache R51 784.0      Orders Placed This Encounter    butalbital-acetaminophen-caffeine (FIORICET, ESGIC) -40 mg per tablet    ondansetron (ZOFRAN ODT) 8 mg disintegrating tablet     alarm signs when to seek emergent care provided and reviewed   I have discussed the diagnosis with the patient and the intended plan as seen in the above orders.   The patient has received an after-visit summary and questions were answered concerning future plans. I have discussed medication side effects and warnings with the patient as well. Patient agreeable with above plan and verbalizes understanding. Follow-up and Dispositions    · Return if symptoms worsen or fail to improve.

## 2019-07-26 NOTE — PATIENT INSTRUCTIONS

## 2019-12-22 DIAGNOSIS — G47.00 INSOMNIA, UNSPECIFIED TYPE: ICD-10-CM

## 2019-12-23 RX ORDER — TRAZODONE HYDROCHLORIDE 50 MG/1
50 TABLET ORAL
Qty: 30 TAB | Refills: 5 | Status: SHIPPED | OUTPATIENT
Start: 2019-12-23 | End: 2020-09-12 | Stop reason: SDUPTHER

## 2019-12-30 ENCOUNTER — TELEPHONE (OUTPATIENT)
Dept: FAMILY MEDICINE CLINIC | Age: 24
End: 2019-12-30

## 2020-09-12 DIAGNOSIS — G47.00 INSOMNIA, UNSPECIFIED TYPE: ICD-10-CM

## 2020-09-14 NOTE — TELEPHONE ENCOUNTER
Last Visit: 7/26/19 with NP Roshni Hopson  Next Appointment: none  Previous Refill Encounter(s): 12/23/19 #30 with 5 refills    Requested Prescriptions     Pending Prescriptions Disp Refills    traZODone (DESYREL) 50 mg tablet 30 Tab 5     Sig: Take 1 Tab by mouth nightly.

## 2020-09-16 RX ORDER — TRAZODONE HYDROCHLORIDE 50 MG/1
50 TABLET ORAL
Qty: 30 TAB | Refills: 0 | Status: SHIPPED | OUTPATIENT
Start: 2020-09-16 | End: 2021-01-06 | Stop reason: SDUPTHER

## 2020-09-16 NOTE — TELEPHONE ENCOUNTER
Please advise patient he will need to schedule an in office for further refills after current 30-day supply

## 2020-10-19 NOTE — PROGRESS NOTES
Pt is here for fever, headache, congestion, nausea, bodyaches, sore throat x 2 days    1. Have you been to the ER, urgent care clinic since your last visit? Hospitalized since your last visit? No    2. Have you seen or consulted any other health care providers outside of the [address] [place] [address] since your last visit? Include any pap smears or colon screening.  No Alternatives Discussed Intro (Do Not Add Period): I discussed alternative treatments to Mohs surgery and specifically discussed the risks and benefits of

## 2021-01-06 ENCOUNTER — VIRTUAL VISIT (OUTPATIENT)
Dept: FAMILY MEDICINE CLINIC | Age: 26
End: 2021-01-06
Payer: COMMERCIAL

## 2021-01-06 DIAGNOSIS — G47.00 INSOMNIA, UNSPECIFIED TYPE: ICD-10-CM

## 2021-01-06 PROCEDURE — 99212 OFFICE O/P EST SF 10 MIN: CPT | Performed by: NURSE PRACTITIONER

## 2021-01-06 RX ORDER — TRAZODONE HYDROCHLORIDE 50 MG/1
50 TABLET ORAL
Qty: 90 TAB | Refills: 0 | Status: SHIPPED | OUTPATIENT
Start: 2021-01-06 | End: 2021-03-31 | Stop reason: SDUPTHER

## 2021-01-06 NOTE — PROGRESS NOTES
Tana Marie is a 27 y.o. male who was seen by synchronous (real-time) audio-video technology on 1/6/2021 for No chief complaint on file. Assessment & Plan:   Diagnoses and all orders for this visit:    1. Insomnia, unspecified type  -     traZODone (DESYREL) 50 mg tablet; Take 1 Tab by mouth nightly. Follow-up and Dispositions    · Return in about 6 months (around 7/6/2021) for CPE with fasting labs. I spent at least 10 minutes on this visit with this established patient. 712  Subjective:   Patient states trazodone continue to work well for his insomnia. States occasionally he does get dry mouth. Comments he gets 8hrs of sleep with trazodone. States sleep is restorative. Prior to Admission medications    Medication Sig Start Date End Date Taking? Authorizing Provider   traZODone (DESYREL) 50 mg tablet Take 1 Tab by mouth nightly. APPOINTMENT  REQUIRED  BEFORE  NEXT  REFILL 9/16/20   Gratiot Bonifacio T, NP   butalbital-acetaminophen-caffeine (FIORICET, ESGIC) -40 mg per tablet Take 1 Tab by mouth every four (4) hours as needed for Headache. 7/26/19   Gratiot Bonifacio T, NP   ondansetron (ZOFRAN ODT) 8 mg disintegrating tablet Take 1 Tab by mouth every eight (8) hours as needed for Nausea. 7/26/19   Elizabeth Valdovinos T, NP   ibuprofen 100 mg tablet Take 800 mg by mouth every eight (8) hours as needed for Pain. Provider, Historical     There is no problem list on file for this patient. There are no active problems to display for this patient. Current Outpatient Medications   Medication Sig Dispense Refill    traZODone (DESYREL) 50 mg tablet Take 1 Tab by mouth nightly. APPOINTMENT  REQUIRED  BEFORE  NEXT  REFILL 30 Tab 0    butalbital-acetaminophen-caffeine (FIORICET, ESGIC) -40 mg per tablet Take 1 Tab by mouth every four (4) hours as needed for Headache. 40 Tab 0    ondansetron (ZOFRAN ODT) 8 mg disintegrating tablet Take 1 Tab by mouth every eight (8) hours as needed for Nausea. 20 Tab 0    ibuprofen 100 mg tablet Take 800 mg by mouth every eight (8) hours as needed for Pain. No Known Allergies  No past medical history on file. No past surgical history on file. Family History   Problem Relation Age of Onset    Hypertension Mother     Crohn's Disease Mother     Hypertension Father      Social History     Tobacco Use    Smoking status: Never Smoker    Smokeless tobacco: Never Used   Substance Use Topics    Alcohol use: Yes     Alcohol/week: 1.0 standard drinks     Types: 1 Glasses of wine per week       ROS    Objective:     Patient-Reported Vitals 1/6/2021   Patient-Reported Weight 161   Patient-Reported Height 5'11      General: alert, cooperative, no distress   Mental  status: normal mood, behavior, speech, dress, motor activity, and thought processes, able to follow commands   HENT: NCAT   Neck: no visualized mass   Resp: no respiratory distress   Neuro: no gross deficits   Skin: no discoloration or lesions of concern on visible areas   Psychiatric: normal affect, consistent with stated mood, no evidence of hallucinations     Additional exam findings: We discussed the expected course, resolution and complications of the diagnosis(es) in detail. Medication risks, benefits, costs, interactions, and alternatives were discussed as indicated. I advised him to contact the office if his condition worsens, changes or fails to improve as anticipated. He expressed understanding with the diagnosis(es) and plan. Grisel Saucedo, who was evaluated through a patient-initiated, synchronous (real-time) audio-video encounter, and/or his healthcare decision maker, is aware that it is a billable service, with coverage as determined by his insurance carrier. He provided verbal consent to proceed: Yes, and patient identification was verified.  It was conducted pursuant to the emergency declaration under the [facility], [facility] waiver authority and the Coronavirus Preparedness and Response Supplemental Appropriations Act. A caregiver was present when appropriate. Ability to conduct physical exam was limited. I was in the office. The patient was at home.       Felipe Wiliam, ROCCO

## 2021-03-31 DIAGNOSIS — G47.00 INSOMNIA, UNSPECIFIED TYPE: ICD-10-CM

## 2021-04-02 RX ORDER — TRAZODONE HYDROCHLORIDE 50 MG/1
50 TABLET ORAL
Qty: 90 TAB | Refills: 1 | Status: SHIPPED | OUTPATIENT
Start: 2021-04-02 | End: 2021-09-20

## 2021-04-02 NOTE — TELEPHONE ENCOUNTER
Last Visit: 1/6/21 with NP Friddie Lesches  Next Appointment: pt cancelled appt's on 3/10/21 & 3/19/21  Previous Refill Encounter(s): 1/6/21 #90    Requested Prescriptions     Pending Prescriptions Disp Refills    traZODone (DESYREL) 50 mg tablet 90 Tab 0     Sig: Take 1 Tab by mouth nightly.

## 2021-09-16 DIAGNOSIS — G47.00 INSOMNIA, UNSPECIFIED TYPE: ICD-10-CM

## 2021-09-20 RX ORDER — TRAZODONE HYDROCHLORIDE 50 MG/1
TABLET ORAL
Qty: 90 TABLET | Refills: 1 | Status: SHIPPED | OUTPATIENT
Start: 2021-09-20 | End: 2021-10-15 | Stop reason: SDUPTHER

## 2021-10-15 ENCOUNTER — OFFICE VISIT (OUTPATIENT)
Dept: FAMILY MEDICINE CLINIC | Age: 26
End: 2021-10-15
Payer: COMMERCIAL

## 2021-10-15 VITALS
OXYGEN SATURATION: 97 % | SYSTOLIC BLOOD PRESSURE: 119 MMHG | RESPIRATION RATE: 18 BRPM | HEIGHT: 71 IN | HEART RATE: 103 BPM | DIASTOLIC BLOOD PRESSURE: 74 MMHG | TEMPERATURE: 93.5 F | BODY MASS INDEX: 23.72 KG/M2 | WEIGHT: 169.4 LBS

## 2021-10-15 DIAGNOSIS — G56.03 BILATERAL CARPAL TUNNEL SYNDROME: Primary | ICD-10-CM

## 2021-10-15 DIAGNOSIS — K14.0 GLOSSITIS: ICD-10-CM

## 2021-10-15 DIAGNOSIS — G47.00 INSOMNIA, UNSPECIFIED TYPE: ICD-10-CM

## 2021-10-15 DIAGNOSIS — K12.0 RECURRENT ORAL APHTHAE: ICD-10-CM

## 2021-10-15 DIAGNOSIS — G89.29 CHRONIC PAIN OF BOTH KNEES: ICD-10-CM

## 2021-10-15 DIAGNOSIS — M25.561 CHRONIC PAIN OF BOTH KNEES: ICD-10-CM

## 2021-10-15 DIAGNOSIS — M25.562 CHRONIC PAIN OF BOTH KNEES: ICD-10-CM

## 2021-10-15 PROCEDURE — 99214 OFFICE O/P EST MOD 30 MIN: CPT | Performed by: NURSE PRACTITIONER

## 2021-10-15 RX ORDER — TRAZODONE HYDROCHLORIDE 50 MG/1
50 TABLET ORAL
Qty: 90 TABLET | Refills: 1 | Status: SHIPPED | OUTPATIENT
Start: 2021-10-15

## 2021-10-15 NOTE — PROGRESS NOTES
Christiano Loja is a 27 y.o. male who was seen in clinic today (10/15/2021) for Carpal Tunnel, Insomnia, Hypertension (check), and Other (bilateral knee (MRI) referral)    Assessment & Plan:   Diagnoses and all orders for this visit:    1. Bilateral carpal tunnel syndrome  -     REFERRAL TO ORTHOPEDICS    2. Insomnia, unspecified type  -     traZODone (DESYREL) 50 mg tablet; Take 1 Tablet by mouth nightly. 3. Chronic pain of both knees  -     MRI KNEE LT WO CONT; Future  -     MRI KNEE RT WO CONT; Future    4. Glossitis  -     VITAMIN B12 & FOLATE; Future  -     CBC WITH AUTOMATED DIFF; Future  -     IRON PROFILE; Future  -     FERRITIN; Future  -     METABOLIC PANEL, COMPREHENSIVE; Future    5. Recurrent oral aphthae  -     VITAMIN B12 & FOLATE; Future  -     CBC WITH AUTOMATED DIFF; Future  -     IRON PROFILE; Future  -     FERRITIN; Future  -     METABOLIC PANEL, COMPREHENSIVE; Future    Other orders  -     magic mouthwash solution; Magic mouth wash: Maalox, Lidocaine 2% viscous, Diphenhydramine oral solution. Pharmacy to mix equal portions of ingredients to a total volume as indicated in the dispense amount. I have discussed the diagnosis with the patient and the intended plan as seen in the above orders. The patient has received an after-visit summary and questions were answered concerning future plans. I have discussed medication side effects and warnings with the patient as well. Patient agreeable with above plan and verbalizes understanding. Follow-up and Dispositions    · Return in about 6 months (around 4/15/2022) for insomnia, in office follow up. Subjective:   [facility]. Comments he was informed he had carpel tunnel and advised to follow up with this PCP. States he has increased pain with typing and also with heavy lifting. Patient reports symptoms have been present for approx a month.     States he has noticed recurrent sores on his tongue for the last week. Comments he has been having difficulty eating/drinking due to the pain. States he was seen for symptoms at [facility] 10/11/2021 for tongue swelling and prescribed amoxicillin and this has helped with symptoms. States he was advised to have labs checked for causes of glossitis and recurrent oral aphthae. Patient requesting a refill on magic mouthwash. Also was informed to be seen by his PCP for elevated blood pressure. Comments the last couple of times he was seen at [facility] his blood pressure was elevated. Patient does have a home blood pressure cuff. He does not monitor his blood pressure at home. Patient requesting MRI for bilateral knee pain. Comments he has constant shooting knee pain. Patient requesting refill on Trazadone. States he was only dispensed a 30-day supply of medication on 9/20/21. Reviewed dispense report in chart which confirms this. Last refill approved by this provider on 9/20/21 was for 90 tabs with 1 refill. Lab Results   Component Value Date/Time    Sodium 138 12/19/2018 01:39 PM    Potassium 4.7 12/19/2018 01:39 PM    Chloride 104 12/19/2018 01:39 PM    CO2 28 12/19/2018 01:39 PM    Anion gap 6 12/19/2018 01:39 PM    Glucose 88 12/19/2018 01:39 PM    BUN 12 12/19/2018 01:39 PM    Creatinine 0.93 12/19/2018 01:39 PM    BUN/Creatinine ratio 13 12/19/2018 01:39 PM    GFR est AA >60 12/19/2018 01:39 PM    GFR est non-AA >60 12/19/2018 01:39 PM    Calcium 9.4 12/19/2018 01:39 PM    Bilirubin, total 1.0 12/19/2018 01:39 PM    Alk.  phosphatase 118 (H) 12/19/2018 01:39 PM    Protein, total 7.9 12/19/2018 01:39 PM    Albumin 4.8 12/19/2018 01:39 PM    Globulin 3.1 12/19/2018 01:39 PM    A-G Ratio 1.5 12/19/2018 01:39 PM    ALT (SGPT) 28 12/19/2018 01:39 PM    AST (SGOT) 16 12/19/2018 01:39 PM     Wt Readings from Last 3 Encounters:   10/15/21 169 lb 6.4 oz (76.8 kg)   07/26/19 161 lb 3.2 oz (73.1 kg)   06/24/19 160 lb 6.4 oz (72.8 kg)     Temp Readings from Last 3 Encounters:   10/15/21 (!) 93.5 °F (34.2 °C) (Temporal)   07/26/19 98.5 °F (36.9 °C) (Oral)   06/24/19 98.7 °F (37.1 °C) (Oral)     BP Readings from Last 3 Encounters:   10/15/21 119/74   07/26/19 126/85   06/24/19 137/81     Pulse Readings from Last 3 Encounters:   10/15/21 (!) 103   07/26/19 90   06/24/19 94     Prior to Admission medications    Medication Sig Start Date End Date Taking? Authorizing Provider   traZODone (DESYREL) 50 mg tablet take 1 tablet by mouth nightly 9/20/21  Yes Kyra Jones NP     The following sections were reviewed & updated as appropriate: PMH, PSH, FH, and SH. Review of Systems   Constitutional: Negative for activity change, appetite change, chills, fatigue and fever. Respiratory: Negative for chest tightness and shortness of breath. Cardiovascular: Negative for chest pain and leg swelling. Musculoskeletal: Positive for arthralgias (bilateral knee and wrist pain). Neurological: Negative for dizziness and headaches. Objective:     Visit Vitals  /74   Pulse (!) 103   Temp (!) 93.5 °F (34.2 °C) (Temporal)   Resp 18   Ht 5' 11\" (1.803 m)   Wt 169 lb 6.4 oz (76.8 kg)   SpO2 97%   BMI 23.63 kg/m²      Physical Exam  Constitutional:       General: He is not in acute distress. Appearance: He is well-developed. He is not diaphoretic. HENT:      Head: Normocephalic and atraumatic. Mouth/Throat:      Comments: No oral ulcerations appreciated during exam today  Neck:      Vascular: No carotid bruit. Cardiovascular:      Rate and Rhythm: Normal rate and regular rhythm. Heart sounds: Normal heart sounds. No murmur heard. No friction rub. No gallop. Pulmonary:      Effort: Pulmonary effort is normal.      Breath sounds: Normal breath sounds. No stridor. No wheezing or rales. Musculoskeletal:      Cervical back: Normal range of motion and neck supple.       Comments: Positive Phalen's test bilaterally Lymphadenopathy:      Cervical: No cervical adenopathy. Skin:     General: Skin is warm and dry. Neurological:      Mental Status: He is alert and oriented to person, place, and time. Disclaimer: The patient understands our medical plan. Alternatives have been explained and offered. The risks, benefits and significant side effects of all medications have been reviewed. Anticipated time course and progression of condition reviewed. All questions have been addressed. He is encouraged to employ the information provided in the after visit summary, which was reviewed. Where applicable, he is instructed to call the clinic if he has not been notified either by phone or through [id number] with the results of his tests or with an appointment plan for any referrals within 1 week(s). No news is not good news; it's no news. The patient  is to call if his condition worsens or fails to improve or if significant side effects are experienced. Aspects of this note may have been generated using voice recognition software. Despite editing, there may be unrecognized errors.        Lilly Coleman NP

## 2021-10-15 NOTE — PROGRESS NOTES
Tato Radford presents today for   Chief Complaint   Patient presents with    Carpal Tunnel    Insomnia       Is someone accompanying this pt? no    Is the patient using any DME equipment during OV? no    Depression Screening:  3 most recent PHQ Screens 10/15/2021   Little interest or pleasure in doing things Not at all   Feeling down, depressed, irritable, or hopeless Not at all   Total Score PHQ 2 0   Trouble falling or staying asleep, or sleeping too much -   Feeling tired or having little energy -   Poor appetite, weight loss, or overeating -   Feeling bad about yourself - or that you are a failure or have let yourself or your family down -   Trouble concentrating on things such as school, work, reading, or watching TV -   Moving or speaking so slowly that other people could have noticed; or the opposite being so fidgety that others notice -   Thoughts of being better off dead, or hurting yourself in some way -   PHQ 9 Score -   How difficult have these problems made it for you to do your work, take care of your home and get along with others -       Learning Assessment:  Learning Assessment 12/19/2018   PRIMARY LEARNER Patient   HIGHEST LEVEL OF EDUCATION - PRIMARY LEARNER  SOME COLLEGE   BARRIERS PRIMARY LEARNER NONE   CO-LEARNER CAREGIVER No   PRIMARY LANGUAGE ENGLISH   LEARNER PREFERENCE PRIMARY DEMONSTRATION   ANSWERED BY self   RELATIONSHIP SELF       Health Maintenance reviewed and discussed and ordered per Provider. Health Maintenance Due   Topic Date Due    Hepatitis C Screening  Never done    Flu Vaccine (1) 09/01/2021   . Coordination of Care:  1. Have you been to the ER, urgent care clinic since your last visit? Hospitalized since your last visit? no    2. Have you seen or consulted any other health care providers outside of the [address] [place] [address] since your last visit? Include any pap smears or colon screening.  no

## 2021-10-15 NOTE — PATIENT INSTRUCTIONS
Canker Sore: Care Instructions  Your Care Instructions  Canker sores are painful white sores in the mouth. They usually begin with a tingling feeling, followed by a red spot or bump that turns white. Canker sores appear most often on the tongue, inside the cheeks, and inside the lips. They can be very painful and can make talking, eating, and drinking difficult. A canker sore may form after an injury or stretching of tissues in the mouth, which can happen, for example, during a dental procedure or teeth cleaning. If you accidentally bite your tongue or the inside of your cheek, you may end up with a canker sore. Other possible causes are infection, certain foods, and stress. Canker sores are not contagious. The pain from your canker sore should decrease in 7 to 10 days, and it should heal completely in 1 to 3 weeks. In most cases, a canker sore will go away by itself. Home treatment can ease pain and discomfort. If you have a large or deep canker sore that does not seem to be getting better after 2 weeks, your doctor may prescribe medicine. Canker sores often come back again. Follow-up care is a key part of your treatment and safety. Be sure to make and go to all appointments, and call your doctor if you are having problems. It's also a good idea to know your test results and keep a list of the medicines you take. How can you care for yourself at home? · Drink cold liquids, such as water or iced tea, or eat flavored ice pops or frozen juices. Use a straw to keep the liquid from coming in contact with your canker sore. · Eat soft, bland foods that are easy to chew and swallow, such as ice cream, custard, applesauce, cottage cheese, macaroni and cheese, soft-cooked eggs, yogurt, or cream soups. · Cut foods into small pieces, or grind, mash, blend, or puree foods to make them easier to chew and swallow.   · While your canker sore heals, avoid coffee, chocolate, spicy and salty foods, citrus fruits, nuts, seeds, and tomatoes. · To soothe your canker sore and help it heal:  ? Use an over-the-counter numbing medicine, such as Orabase or Anbesol. ? Dab a bit of Milk of Magnesia on the canker sore 3 or 4 times a day. · Put ice on your sore to reduce the pain. · Take anti-inflammatory medicines to reduce pain, as needed. These include ibuprofen (Advil, Motrin) and naproxen (Aleve). Read and follow all instructions on the label. · Use a soft-bristle toothbrush, and brush your teeth well but carefully. · Do not smoke or use spit tobacco. Tobacco can cause mouth problems and slow healing. If you need help quitting, talk to your doctor about stop-smoking programs and medicines. These can increase your chances of quitting for good. When should you call for help? Call your doctor now or seek immediate medical care if:    · You have signs of infection, such as:  ? Increased pain, swelling, warmth, or redness. ? Red streaks leading from the area. ? Pus draining from the area. ? A fever. Watch closely for changes in your health, and be sure to contact your doctor if:    · You do not get better as expected. Where can you learn more? Go to http://www.gray.com/  Enter E216 in the search box to learn more about \"Canker Sore: Care Instructions. \"  Current as of: June 30, 2021               Content Version: 13.0  © 4232-0686 [employer]. Care instructions adapted under license by [employer] (which disclaims liability or warranty for this information). If you have questions about a medical condition or this instruction, always ask your healthcare professional. [place] [address] any warranty or liability for your use of this information. Glossitis: Care Instructions  Your Care Instructions     Glossitis is swelling of the tongue. The tongue looks smooth and may be an unusual color from pinkish to dark red.  Glossitis is often caused by an infection. Other causes include injury, irritation from tobacco or spicy foods, or a poor diet. Glossitis can make it hard for you to talk, chew, or swallow, especially if you get sores on your tongue. Treatment for glossitis depends on the cause. An infection is treated with antibiotics. Other medicines can relieve swelling and pain. If the swelling is severe, your doctor may prescribe steroids. Follow-up care is a key part of your treatment and safety. Be sure to make and go to all appointments, and call your doctor if you are having problems. It's also a good idea to know your test results and keep a list of the medicines you take. How can you care for yourself at home? · If your doctor prescribed antibiotics, take them as directed. Do not stop taking them just because you feel better. You need to take the full course of antibiotics. · You may want to eat a bland or liquid diet while you have glossitis. [place] foods include mashed potatoes, soft breads, cream soups, eggs, and soft and well-cooked vegetables. · Avoid spicy or hot foods and citrus fruits like orange juice or braulio that can make the swelling of glossitis worse. · Rinse your mouth with a mixture of a half-teaspoon of baking soda in 1 cup of warm water. · Floss your teeth every day. Brush your teeth at least two times a day. Clean your tongue when you brush your teeth. · Do not smoke, chew, or dip tobacco. Tobacco use can cause glossitis. If you need help quitting, talk to your doctor about programs and medicines. These can increase your chances of quitting for good. When should you call for help? Call 911 anytime you think you may need emergency care. For example, call if:    · You have trouble breathing. Call your doctor now or seek immediate medical care if:    · You have signs of infection, such as:  ? Increased pain, swelling, warmth, or redness. ? Red streaks leading from the area. ? Pus draining from the area. ? A fever. Watch closely for changes in your health, and be sure to contact your doctor if:    · You do not get better as expected. Where can you learn more? Go to http://www.gray.com/  Enter E819 in the search box to learn more about \"Glossitis: Care Instructions. \"  Current as of: June 30, 2021               Content Version: 13.0  © 2006-2021 [employer]. Care instructions adapted under license by [employer] (which disclaims liability or warranty for this information). If you have questions about a medical condition or this instruction, always ask your healthcare professional. [place] [address] any warranty or liability for your use of this information. Carpal Tunnel Syndrome: Care Instructions  Overview     Carpal tunnel syndrome is numbness, tingling, weakness, and pain in your hand, wrist, and sometimes forearm. It is caused by pressure on the median nerve. This nerve and several tough tissues called tendons run through a space in the wrist. This space is called the carpal tunnel. The repeated hand motions used in work and some hobbies and sports can put pressure on the median nerve. Pregnancy can cause carpal tunnel syndrome. Several conditions, such as diabetes, arthritis, and an underactive thyroid, can also cause it. You may be able to limit an activity or change the way you do it to reduce your symptoms. You also can take other steps to feel better. If your symptoms are mild, 1 to 2 weeks of home treatment are likely to ease your pain. Surgery is needed only if other treatments do not work. Follow-up care is a key part of your treatment and safety. Be sure to make and go to all appointments, and call your doctor if you are having problems. It's also a good idea to know your test results and keep a list of the medicines you take. How can you care for yourself at home?   · If possible, stop or reduce the activity that causes your symptoms. If you cannot stop the activity, take frequent breaks to rest and stretch or change hand positions to do a task. Try switching hands, such as when using a computer mouse. · Try to avoid bending or twisting your wrists. · Ask your doctor if you can take an over-the-counter pain medicine, such as acetaminophen (Tylenol), ibuprofen (Advil, Motrin), or naproxen (Aleve). Be safe with medicines. Read and follow all instructions on the label. · If your doctor prescribes corticosteroid medicine to help reduce pain and swelling, take it exactly as prescribed. Call your doctor if you think you are having a problem with your medicine. · Put ice or a cold pack on your wrist for 10 to 20 minutes at a time to ease pain. Put a thin cloth between the ice and your skin. · If your doctor or your physical or occupational therapist tells you to wear a wrist splint, wear it as directed to keep your wrist in a neutral position. This also eases pressure on your median nerve. · Ask your doctor whether you should have physical or occupational therapy to learn how to do tasks differently. · Try a yoga class to stretch your muscles and build strength in your hands and wrists. Yoga has been shown to ease carpal tunnel symptoms. To prevent carpal tunnel  · When working at a computer, keep your hands and wrists in line with your forearms. Hold your elbows close to your sides. Take a break every 10 to 15 minutes. · Try these exercises:  ? Warm up: Rotate your wrist up, down, and from side to side. Repeat this 4 times. Stretch your fingers far apart, relax them, then stretch them again. Repeat 4 times. Stretch your thumb by pulling it back gently, holding it, and then releasing it. Repeat 4 times. ? Prayer stretch: Start with your palms together in front of your chest just below your chin.  Slowly lower your hands toward your waistline while keeping your hands close to your stomach and your palms together until you feel a mild to moderate stretch under your forearms. Hold for 10 to 20 seconds. Repeat 4 times. ? Wrist flexor stretch: Hold your arm in front of you with your palm up. Bend your wrist, pointing your hand toward the floor. With your other hand, gently bend your wrist further until you feel a mild to moderate stretch in your forearm. Hold for 10 to 20 seconds. Repeat 4 times. ? Wrist extensor stretch: Repeat the steps for the wrist flexor stretch, but begin with your extended hand palm down. · Squeeze a rubber exercise ball several times a day to keep your hands and fingers strong. · Avoid holding objects (such as a book) in one position for a long time. When possible, use your whole hand to grasp an object. Using just the thumb and index finger can put stress on the wrist.  · Do not smoke. It can make this condition worse by reducing blood flow to the median nerve. If you need help quitting, talk to your doctor about stop-smoking programs and medicines. These can increase your chances of quitting for good. When should you call for help? Watch closely for changes in your health, and be sure to contact your doctor if:    · Your pain or other problems do not get better with home care.     · You want more information about physical or occupational therapy.     · You have side effects of your corticosteroid medicine, such as:  ? Weight gain. ? Mood changes. ? Trouble sleeping. ? Bruising easily.     · You have any other problems with your medicine. Where can you learn more? Go to http://www.gray.com/  Enter R432 in the search box to learn more about \"Carpal Tunnel Syndrome: Care Instructions. \"  Current as of: July 1, 2021               Content Version: 13.0  © 7461-2157 [employer]. Care instructions adapted under license by [employer] (which disclaims liability or warranty for this information).  If you have questions about a medical condition or this instruction, always ask your healthcare professional. [place] [address] any warranty or liability for your use of this information. Carpal Tunnel Syndrome: Exercises  Introduction  Here are some examples of exercises for you to try. The exercises may be suggested for a condition or for rehabilitation. Start each exercise slowly. Ease off the exercises if you start to have pain. You will be told when to start these exercises and which ones will work best for you. Warm-up stretches  When you no longer have pain or numbness, you can do exercises to help prevent carpal tunnel syndrome from coming back. Do not do any stretch or movement that is uncomfortable or painful. 1. Rotate your wrist up, down, and from side to side. Repeat 4 times. 2. Stretch your fingers far apart. Relax them, and then stretch them again. Repeat 4 times. 3. Stretch your thumb by pulling it back gently, holding it, and then releasing it. Repeat 4 times. How to do the exercises  Prayer stretch    1. Start with your palms together in front of your chest just below your chin. 2. Slowly lower your hands toward your waistline, keeping your hands close to your stomach and your palms together until you feel a mild to moderate stretch under your forearms. 3. Hold for at least 15 to 30 seconds. Repeat 2 to 4 times. Wrist flexor stretch    1. Extend your arm in front of you with your palm up. 2. Bend your wrist, pointing your hand toward the floor. 3. With your other hand, gently bend your wrist farther until you feel a mild to moderate stretch in your forearm. 4. Hold for at least 15 to 30 seconds. Repeat 2 to 4 times. Wrist extensor stretch    1. Repeat steps 1 through 4 of the stretch above, but begin with your extended hand palm down. Follow-up care is a key part of your treatment and safety. Be sure to make and go to all appointments, and call your doctor if you are having problems.  It's also a good idea to know your test results and keep a list of the medicines you take. Where can you learn more? Go to http://www.gray.com/  Enter W179 in the search box to learn more about \"Carpal Tunnel Syndrome: Exercises. \"  Current as of: July 1, 2021               Content Version: 13.0  © 6030-1238 [employer]. Care instructions adapted under license by [employer] (which disclaims liability or warranty for this information). If you have questions about a medical condition or this instruction, always ask your healthcare professional. [address] any warranty or liability for your use of this information.

## 2021-11-02 ENCOUNTER — HOSPITAL ENCOUNTER (OUTPATIENT)
Dept: LAB | Age: 26
Discharge: HOME | End: 2021-11-02
Payer: COMMERCIAL

## 2021-11-02 DIAGNOSIS — K14.0 GLOSSITIS: ICD-10-CM

## 2021-11-02 DIAGNOSIS — K12.0 RECURRENT ORAL APHTHAE: ICD-10-CM

## 2021-11-02 LAB
ALBUMIN SERPL-MCNC: 4.5 G/DL (ref 3.4–5)
ALBUMIN/GLOB SERPL: 1.6 {RATIO} (ref 0.8–1.7)
ALP SERPL-CCNC: 91 U/L (ref 45–117)
ALT SERPL-CCNC: 36 U/L (ref 16–61)
ANION GAP SERPL CALC-SCNC: 4 MMOL/L (ref 3–18)
AST SERPL-CCNC: 19 U/L (ref 10–38)
BASOPHILS # BLD: 0.1 K/UL (ref 0–0.1)
BASOPHILS NFR BLD: 1 % (ref 0–2)
BILIRUB SERPL-MCNC: 0.6 MG/DL (ref 0.2–1)
BUN SERPL-MCNC: 11 MG/DL (ref 7–18)
BUN/CREAT SERPL: 10 (ref 12–20)
CALCIUM SERPL-MCNC: 9.1 MG/DL (ref 8.5–10.1)
CHLORIDE SERPL-SCNC: 108 MMOL/L (ref 100–111)
CO2 SERPL-SCNC: 28 MMOL/L (ref 21–32)
CREAT SERPL-MCNC: 1.09 MG/DL (ref 0.6–1.3)
DIFFERENTIAL METHOD BLD: ABNORMAL
EOSINOPHIL # BLD: 0.3 K/UL (ref 0–0.4)
EOSINOPHIL NFR BLD: 6 % (ref 0–5)
ERYTHROCYTE [DISTWIDTH] IN BLOOD BY AUTOMATED COUNT: 12.4 % (ref 11.6–14.5)
FERRITIN SERPL-MCNC: 44 NG/ML (ref 8–388)
FOLATE SERPL-MCNC: 9.6 NG/ML (ref 3.1–17.5)
GLOBULIN SER CALC-MCNC: 2.8 G/DL (ref 2–4)
GLUCOSE SERPL-MCNC: 88 MG/DL (ref 74–99)
HCT VFR BLD AUTO: 41 % (ref 36–48)
HGB BLD-MCNC: 13.4 G/DL (ref 13–16)
IRON SATN MFR SERPL: 37 % (ref 20–50)
IRON SERPL-MCNC: 131 UG/DL (ref 50–175)
LYMPHOCYTES # BLD: 1.9 K/UL (ref 0.9–3.6)
LYMPHOCYTES NFR BLD: 33 % (ref 21–52)
MCH RBC QN AUTO: 29.5 PG (ref 24–34)
MCHC RBC AUTO-ENTMCNC: 32.7 G/DL (ref 31–37)
MCV RBC AUTO: 90.3 FL (ref 78–100)
MONOCYTES # BLD: 0.4 K/UL (ref 0.05–1.2)
MONOCYTES NFR BLD: 8 % (ref 3–10)
NEUTS SEG # BLD: 3 K/UL (ref 1.8–8)
NEUTS SEG NFR BLD: 53 % (ref 40–73)
PLATELET # BLD AUTO: 260 K/UL (ref 135–420)
PMV BLD AUTO: 10 FL (ref 9.2–11.8)
POTASSIUM SERPL-SCNC: 4.5 MMOL/L (ref 3.5–5.5)
PROT SERPL-MCNC: 7.3 G/DL (ref 6.4–8.2)
RBC # BLD AUTO: 4.54 M/UL (ref 4.35–5.65)
SODIUM SERPL-SCNC: 140 MMOL/L (ref 136–145)
TIBC SERPL-MCNC: 352 UG/DL (ref 250–450)
VIT B12 SERPL-MCNC: 935 PG/ML (ref 211–911)
WBC # BLD AUTO: 5.7 K/UL (ref 4.6–13.2)

## 2021-11-02 PROCEDURE — 83540 ASSAY OF IRON: CPT

## 2021-11-02 PROCEDURE — 82728 ASSAY OF FERRITIN: CPT

## 2021-11-02 PROCEDURE — 85025 COMPLETE CBC W/AUTO DIFF WBC: CPT

## 2021-11-02 PROCEDURE — 36415 COLL VENOUS BLD VENIPUNCTURE: CPT

## 2021-11-02 PROCEDURE — 82607 VITAMIN B-12: CPT

## 2021-11-02 PROCEDURE — 80053 COMPREHEN METABOLIC PANEL: CPT

## 2021-11-16 ENCOUNTER — HOSPITAL ENCOUNTER (OUTPATIENT)
Age: 26
Discharge: HOME | End: 2021-11-16
Attending: NURSE PRACTITIONER
Payer: COMMERCIAL

## 2021-11-16 DIAGNOSIS — G89.29 CHRONIC PAIN OF BOTH KNEES: ICD-10-CM

## 2021-11-16 DIAGNOSIS — M25.562 CHRONIC PAIN OF BOTH KNEES: ICD-10-CM

## 2021-11-16 DIAGNOSIS — M25.561 CHRONIC PAIN OF BOTH KNEES: ICD-10-CM

## 2021-11-16 PROCEDURE — 73721 MRI JNT OF LWR EXTRE W/O DYE: CPT

## 2021-11-19 ENCOUNTER — OFFICE VISIT (OUTPATIENT)
Dept: ORTHOPEDIC SURGERY | Age: 26
End: 2021-11-19
Payer: COMMERCIAL

## 2021-11-19 VITALS
BODY MASS INDEX: 25.62 KG/M2 | OXYGEN SATURATION: 98 % | TEMPERATURE: 98 F | HEART RATE: 86 BPM | WEIGHT: 183 LBS | HEIGHT: 71 IN

## 2021-11-19 DIAGNOSIS — M22.41 CHONDROMALACIA OF RIGHT PATELLA: ICD-10-CM

## 2021-11-19 DIAGNOSIS — M25.561 CHRONIC PAIN OF RIGHT KNEE: Primary | ICD-10-CM

## 2021-11-19 DIAGNOSIS — G89.29 CHRONIC PAIN OF RIGHT KNEE: Primary | ICD-10-CM

## 2021-11-19 PROCEDURE — 20610 DRAIN/INJ JOINT/BURSA W/O US: CPT | Performed by: SPECIALIST

## 2021-11-19 PROCEDURE — 99213 OFFICE O/P EST LOW 20 MIN: CPT | Performed by: SPECIALIST

## 2021-11-19 RX ORDER — BETAMETHASONE SODIUM PHOSPHATE AND BETAMETHASONE ACETATE 3; 3 MG/ML; MG/ML
3 INJECTION, SUSPENSION INTRA-ARTICULAR; INTRALESIONAL; INTRAMUSCULAR; SOFT TISSUE ONCE
Status: COMPLETED | OUTPATIENT
Start: 2021-11-19 | End: 2021-11-19

## 2021-11-19 RX ADMIN — BETAMETHASONE SODIUM PHOSPHATE AND BETAMETHASONE ACETATE 3 MG: 3; 3 INJECTION, SUSPENSION INTRA-ARTICULAR; INTRALESIONAL; INTRAMUSCULAR; SOFT TISSUE at 17:17

## 2021-11-19 NOTE — LETTER
NOTIFICATION RETURN TO WORK / SCHOOL    11/19/2021 5:16 PM    Mr. Bette Abad  [address] [facility] [id number]      To Whom It May Concern:    Bette Abad is currently under the care of [address] [place] [address]. He should be excused from the running portion of the PT test.    If there are questions or concerns please have the patient contact our office.         Sincerely,      Rogelio Santos MD

## 2021-11-19 NOTE — PROGRESS NOTES
Patient: Grisel Saucedo                MRN: [id number]       SSN: [id number]  YOB: 1995        AGE: 27 y.o. SEX: male    PCP: Cabrera Ramirez NP  11/22/21    Chief Complaint   Patient presents with    Knee Pain     Bilat     HISTORY:  Grisel Saucedo is a 27 y.o. male who is seen for bilateral knee pain R>L. He has been experiencing increasing knee pain for the past 4 years. There is no history of injury. He notes that running in [employer] training caused his knee pains. He was previously seen by Dr. Federico Chowdhury and Dr. Trung York. NP Perez Doug recently ordered a right MRI which revealed patellar tendon lateral condylar friction syndrome. He was treated non surgically for patellofemoral pain syndrome. He tried to push through his pain after a previous physical test assessment. He notes mostly anterior knee pain. He feels popping and grinding all of the time. He notes that he is unable to even walk long distances due to his knee pain. He notes pain with standing, walking, and stair climbing. He experiences some startup pain after sitting. He rides a bike for exercise since he has difficulty running. He is concerned about an upcoming PT test. His father and sister both have chronic knee pain and patellofemoral knee pain. Pain Assessment  11/19/2021   Location of Pain Knee   Location Modifiers Right;Left   Severity of Pain 5   Quality of Pain Aching   Duration of Pain Persistent   Frequency of Pain Constant   Aggravating Factors Stairs   Limiting Behavior Some   Relieving Factors Nothing   Result of Injury No   Type of Injury -   Type of Injury Comment -     Occupation, etc:  Mr. Anjelica Wagner is an  in the [employer]. He lives with his parents in [place]. He is present with his mother. Mr. Anjelica Wagner weighs 183 lbs and is 5'11\" tall.        No results found for: HBA1C, EDT5YNNO, WTM3PYRA, FYL3ZLPB  Weight Metrics 11/19/2021 10/15/2021 7/26/2019 6/24/2019 5/31/2019 4/5/2019 1/16/2019 Weight 183 lb 169 lb 6.4 oz 161 lb 3.2 oz 160 lb 6.4 oz 165 lb 3.2 oz 157 lb 3.2 oz 159 lb 12.8 oz   BMI 25.52 kg/m2 23.63 kg/m2 22.48 kg/m2 22.37 kg/m2 23.04 kg/m2 22.51 kg/m2 22.88 kg/m2       There is no problem list on file for this patient. REVIEW OF SYSTEMS: All Below are Negative except: See HPI   Constitutional: negative for fever, chills, and weight loss. Cardiovascular: negative for chest pain, claudication, leg swelling, SOB, WILLIAMSON   Gastrointestinal: Negative for pain, N/V/C/D, Blood in stool or urine, dysuria, hematuria, incontinence, pelvic pain. Musculoskeletal: See HPI   Neurological: Negative for dizziness and weakness. Negative for headaches, Visual changes, confusion, seizures   Phychiatric/Behavioral: Negative for depression, memory loss, substance abuse. Extremities: Negative for hair changes, rash, or skin lesion changes. Hematologic: Negative for bleeding problems, bruising, pallor or swollen lymph nodes   Peripheral Vascular: No calf pain, no circulation deficits. Social History     Socioeconomic History    Marital status: SINGLE     Spouse name: Not on file    Number of children: Not on file    Years of education: Not on file    Highest education level: Not on file   Occupational History    Not on file   Tobacco Use    Smoking status: Never Smoker    Smokeless tobacco: Never Used   Substance and Sexual Activity    Alcohol use: Yes     Alcohol/week: 1.0 standard drink     Types: 1 Glasses of wine per week    Drug use: No    Sexual activity: Never   Other Topics Concern    Not on file   Social History Narrative    Not on file     Social Determinants of Health     Financial Resource Strain:     Difficulty of Paying Living Expenses: Not on file   Food Insecurity:     Worried About Running Out of Food in the Last Year: Not on file    Stevo of Food in the Last Year: Not on file   Transportation Needs:     Lack of Transportation (Medical):  Not on file    Lack of Transportation (Non-Medical): Not on file   Physical Activity:     Days of Exercise per Week: Not on file    Minutes of Exercise per Session: Not on file   Stress:     Feeling of Stress : Not on file   Social Connections:     Frequency of Communication with Friends and Family: Not on file    Frequency of Social Gatherings with Friends and Family: Not on file    Attends Muslim Services: Not on file    Active Member of [address] [place] [address] or Organizations: Not on file    Attends Club or Organization Meetings: Not on file    Marital Status: Not on file   Intimate Partner Violence:     Fear of Current or Ex-Partner: Not on file    Emotionally Abused: Not on file    Physically Abused: Not on file    Sexually Abused: Not on file   Housing Stability:     Unable to Pay for Housing in the Last Year: Not on file    Number of [place] in the Last Year: Not on file    Unstable Housing in the Last Year: Not on file      No Known Allergies   Current Outpatient Medications   Medication Sig    traZODone (DESYREL) 50 mg tablet Take 1 Tablet by mouth nightly.  magic mouthwash solution Magic mouth wash: Maalox, Lidocaine 2% viscous, Diphenhydramine oral solution. Pharmacy to mix equal portions of ingredients to a total volume as indicated in the dispense amount. (Patient not taking: Reported on 11/19/2021)     No current facility-administered medications for this visit.       PHYSICAL EXAMINATION:  Visit Vitals  Pulse 86   Temp 98 °F (36.7 °C) (Temporal)   Ht 5' 11\" (1.803 m)   Wt 183 lb (83 kg)   SpO2 98%   BMI 25.52 kg/m²      ORTHO EXAMINATION:  Examination Right knee Left knee   Skin Intact Intact   Range of motion 130-0 130-0   Effusion - -   Medial joint line tenderness + anterior  + anterior    Lateral joint line tenderness - -   Popliteal tenderness - -   Osteophytes palpable + medial  + medial    Brandens - -   Patella crepitus + +   Anterior drawer - -   Lateral laxity - -   Medial laxity - -   Varus deformity - -   Valgus deformity - -   Pretibial edema - -   Calf tenderness - -     TIME OUT:  Chart reviewed for the following:   I, Armen Barraza MD, have reviewed the History, Physical and updated the Allergic reactions for [address] performed immediately prior to start of procedure:  Drew Rojas MD, have performed the following reviews on Jackson Specking prior to the start of the procedure:          * Patient was identified by name and date of birth   * Agreement on procedure being performed was verified  * Risks and Benefits explained to the patient  * Procedure site verified and marked as necessary  * Patient was positioned for comfort  * Consent was obtained     Time: 5:12 PM     Date of procedure: 11/22/2021  Procedure performed by:  Armen Barraza MD  Mr. Arlene Colmenares tolerated the procedure well with no complications. MRI RIGHT KNEE 11/16/21   IMPRESSION  1. 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 Patellar tendon lateral condylar friction syndrome as suggested in 2017    MRI LEFT KNEE 11/16/21  IMPRESSION  1. Findings suggestive of mild trochlear dysplasia and patellar maltracking. Patellar tendon lateral femoral condyle friction syndrome, as first suggested in 2017  2. Increased TTTG, suggesting patellar instability. MRI LEFT KNEE 5/16/17 [facility]  IMPRESSION:   1. 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 2.  Mild trochlear dysplasia. Grade I chondromalacia of the inferolateral retropatellar cartilage which may be  related to the patellar tendon lateral femoral condyle friction syndrome. RADIOGRAPHS:  XR KASANDRA KNEE 5/8/17 [facility]  IMPRESSION:  Three views - No fractures, no effusion, no joint space narrowing, no osteophytes present. Kellgren Kyler grade 0    IMPRESSION:      ICD-10-CM ICD-9-CM    1.  Chronic pain of right knee  M25.561 719.46 betamethasone (CELESTONE) injection 3 mg    G89.29 338.29 DRAIN/INJECT LARGE JOINT/BURSA   2. Chondromalacia of right patella  M22.41 717.7      PLAN:  Excuse patient out of the [employer] PT test. After discussing treatment options, patient's right knee was injected with 4 cc Marcaine and 1/2 cc Celestone. There is no need for surgery at this time. He will follow up as needed.      Scribed by MD Dee Katz) as dictated by Surekha Orellana MD

## 2022-02-10 ENCOUNTER — OFFICE VISIT (OUTPATIENT)
Dept: CARDIOLOGY CLINIC | Age: 27
End: 2022-02-10
Payer: COMMERCIAL

## 2022-02-10 VITALS
BODY MASS INDEX: 26.18 KG/M2 | SYSTOLIC BLOOD PRESSURE: 144 MMHG | OXYGEN SATURATION: 99 % | HEIGHT: 71 IN | WEIGHT: 187 LBS | DIASTOLIC BLOOD PRESSURE: 82 MMHG

## 2022-02-10 DIAGNOSIS — R06.02 SOB (SHORTNESS OF BREATH) ON EXERTION: ICD-10-CM

## 2022-02-10 DIAGNOSIS — R07.9 CHEST PAIN, UNSPECIFIED TYPE: Primary | ICD-10-CM

## 2022-02-10 DIAGNOSIS — I10 ESSENTIAL HYPERTENSION: ICD-10-CM

## 2022-02-10 PROCEDURE — 99203 OFFICE O/P NEW LOW 30 MIN: CPT | Performed by: INTERNAL MEDICINE

## 2022-02-10 RX ORDER — AMLODIPINE BESYLATE 5 MG/1
5 TABLET ORAL DAILY
COMMUNITY
Start: 2022-01-17

## 2022-02-10 RX ORDER — AMOXICILLIN AND CLAVULANATE POTASSIUM 875; 125 MG/1; MG/1
1 TABLET, FILM COATED ORAL EVERY 12 HOURS
COMMUNITY
Start: 2022-02-04 | End: 2022-02-14

## 2022-02-10 NOTE — PROGRESS NOTES
HISTORY OF PRESENT ILLNESS  Amirah Maxwell is a 24 y.o. male. 2/22 seen as new patient for chest pain    New Patient  The history is provided by the patient. Associated symptoms include chest pain and shortness of breath. Pertinent negatives include no headaches. Chest Pain (Angina)   The history is provided by the patient. This is a new problem. The current episode started more than 1 week ago (Early 2022). The pain is associated with rest and normal activity. The pain is present in the lateral region and left side. The pain does not radiate. Associated symptoms include shortness of breath. Pertinent negatives include no claudication, no cough, no diaphoresis, no dizziness, no fever, no headaches, no malaise/fatigue, no nausea, no orthopnea, no palpitations, no PND and no vomiting. He has tried nothing for the symptoms. Shortness of Breath  The history is provided by the patient. This is a new problem. The problem occurs intermittently. The current episode started more than 1 week ago (1/22). Associated symptoms include chest pain. Pertinent negatives include no fever, no headaches, no cough, no wheezing, no PND, no orthopnea, no vomiting, no rash, no leg swelling and no claudication. The problem's precipitants include exercise (steps). No Known Allergies    Past Medical History:   Diagnosis Date    Essential hypertension since age 23       Family History   Problem Relation Age of Onset    Hypertension Mother     Crohn's Disease Mother     Hypertension Father     Stroke Neg Hx     Heart Attack Neg Hx        Social History     Tobacco Use    Smoking status: Never Smoker    Smokeless tobacco: Never Used   Substance Use Topics    Alcohol use: Yes     Alcohol/week: 1.0 standard drink     Types: 1 Glasses of wine per week    Drug use: No        Current Outpatient Medications   Medication Sig    amLODIPine (NORVASC) 5 mg tablet Take 5 mg by mouth daily.     amoxicillin-clavulanate (AUGMENTIN) 615-774 mg per tablet Take 1 Tablet by mouth every twelve (12) hours.  traZODone (DESYREL) 50 mg tablet Take 1 Tablet by mouth nightly. No current facility-administered medications for this visit. History reviewed. No pertinent surgical history. Visit Vitals  BP (!) 144/82 (BP 1 Location: Left upper arm, BP Patient Position: Sitting, BP Cuff Size: Adult long)   Ht 5' 11\" (1.803 m)   Wt 84.8 kg (187 lb)   SpO2 99%   BMI 26.08 kg/m²       Diagnostic Studies:  I have reviewed the relevant tests done on the patient and show as follows  EKG tracings reviewed by me today. EKG Results     None        XR Results (most recent):  Results from Hospital Encounter encounter on 09/12/11    XR WRIST RIGHT AP / LATERAL / OBLIQUE    Narrative  Ordering MD: Paulette Rowe MD  Signed By: Wilmot Councilman DO  ** FINAL **  ---------------------------------------------------------------------  Procedure Date:  09/12/2011   Accession Number:  [id number]  Order No:   [id number]  Procedure:   [facility] - WRIST RT MIN 3 VIEWS  CPT Code:   06077  Admit Diag:   WRIST SPRAIN NOS  Reason:   PAIN  INTERPRETATION:  INDICATION: Pain. COMPARISON: None. FINDINGS: Three views of the right wrist demonstrate normal bone  mineralization. There is no fracture or other osseous, articular or  soft tissue abnormality. IMPRESSION:  Normal right wrist.    Mr. Luis Felipe Collier has a reminder for a \"due or due soon\" health maintenance. I have asked that he contact his primary care provider for follow-up on this health maintenance. Review of Systems   Constitutional: Negative for chills, diaphoresis, fever, malaise/fatigue and weight loss. HENT: Negative for nosebleeds. Eyes: Negative for discharge. Respiratory: Positive for shortness of breath. Negative for cough and wheezing. Cardiovascular: Positive for chest pain. Negative for palpitations, orthopnea, claudication, leg swelling and PND. Gastrointestinal: Negative for diarrhea, nausea and vomiting. Genitourinary: Negative for dysuria and hematuria. Musculoskeletal: Negative for joint pain. Skin: Negative for rash. Neurological: Negative for dizziness, seizures, loss of consciousness and headaches. Endo/Heme/Allergies: Negative for polydipsia. Does not bruise/bleed easily. Psychiatric/Behavioral: Negative for depression and substance abuse. The patient does not have insomnia. Physical Exam  Constitutional:       General: He is not in acute distress. Appearance: He is well-developed. HENT:      Head: Normocephalic and atraumatic. Mouth/Throat:      Dentition: Normal dentition. Eyes:      General: No scleral icterus. Right eye: No discharge. Left eye: No discharge. Neck:      Thyroid: No thyromegaly. Vascular: No carotid bruit or JVD. Cardiovascular:      Rate and Rhythm: Normal rate and regular rhythm. Pulses: Intact distal pulses. Heart sounds: Normal heart sounds, S1 normal and S2 normal. No murmur heard. No friction rub. No gallop. Pulmonary:      Effort: Pulmonary effort is normal.      Breath sounds: Normal breath sounds. No wheezing or rales. Abdominal:      Palpations: Abdomen is soft. There is no mass. Tenderness: There is no abdominal tenderness. Musculoskeletal:      Cervical back: Neck supple. Right lower leg: No edema. Left lower leg: No edema. Lymphadenopathy:      Cervical:      Right cervical: No superficial cervical adenopathy. Left cervical: No superficial cervical adenopathy. Skin:     General: Skin is warm and dry. Findings: No rash. Neurological:      Mental Status: He is alert and oriented to person, place, and time. Psychiatric:         Behavior: Behavior normal.         ASSESSMENT and PLAN      Diagnoses and all orders for this visit:    1. Chest pain, unspecified type  -     LIPID PANEL; Future  -     EXERCISE CARDIAC STRESS TEST; Future    2.  SOB (shortness of breath) on exertion  - ECHO ADULT COMPLETE; Future    3. Essential hypertension  -     LIPID PANEL; Future        Pertinent laboratory and test data reviewed and discussed with patient. See patient instructions also for other medical advice given    Medications Discontinued During This Encounter   Medication Reason    magic mouthwash solution LIST CLEANUP       Follow-up and Dispositions    · Return in about 3 months (around 5/10/2022), or if symptoms worsen or fail to improve, for post test.       2/10/2022 chest pain is atypical but given risk factors of hypertension, check lipids and a treadmill stress test.  Dyspnea is concerning and check an echo for cardiac structure and function. Healthy lifestyle discussed and Mediterranean diet guidelines given.

## 2022-02-10 NOTE — PATIENT INSTRUCTIONS
Medications Discontinued During This Encounter   Medication Reason    magic mouthwash solution LIST CLEANUP          Learning About the Mediterranean Diet  What is the Mediterranean diet? The Mediterranean diet is a style of eating rather than a diet plan. It features foods eaten in [place], [place], [place] and [place], and other countries along the [facility]. It emphasizes eating foods like fish, fruits, vegetables, beans, high-fiber breads and whole grains, nuts, and olive oil. This style of eating includes limited red meat, cheese, and sweets. Why choose the Mediterranean diet? A Mediterranean-style diet may improve heart health. It contains more fat than other heart-healthy diets. But the fats are mainly from nuts, unsaturated oils (such as fish oils and olive oil), and certain nut or seed oils (such as canola, soybean, or flaxseed oil). These fats may help protect the heart and blood vessels. How can you get started on the Mediterranean diet? Here are some things you can do to switch to a more Mediterranean way of eating. What to eat  · Eat a variety of fruits and vegetables each day, such as grapes, blueberries, tomatoes, broccoli, peppers, figs, olives, spinach, eggplant, beans, lentils, and chickpeas. · Eat a variety of whole-grain foods each day, such as oats, brown rice, and whole wheat bread, pasta, and couscous. · Eat fish at least 2 times a week. Try tuna, salmon, mackerel, lake trout, herring, or sardines. · Eat moderate amounts of low-fat dairy products, such as milk, cheese, or yogurt. · Eat moderate amounts of poultry and eggs. · Choose healthy (unsaturated) fats, such as nuts, olive oil, and certain nut or seed oils like canola, soybean, and flaxseed. · Limit unhealthy (saturated) fats, such as butter, palm oil, and coconut oil. And limit fats found in animal products, such as meat and dairy products made with whole milk.  Try to eat red meat only a few times a month in very small amounts. · Limit sweets and desserts to only a few times a week. This includes sugar-sweetened drinks like soda. The Mediterranean diet may also include red wine with your meal--1 glass each day for women and up to 2 glasses a day for men. Tips for eating at home  · Use herbs, spices, garlic, lemon zest, and citrus juice instead of salt to add flavor to foods. · Add avocado slices to your sandwich instead of mendenhall. · Have fish for lunch or dinner instead of red meat. Brush the fish with olive oil, and broil or grill it. · Sprinkle your salad with seeds or nuts instead of cheese. · Cook with olive or canola oil instead of butter or oils that are high in saturated fat. · Switch from 2% milk or whole milk to 1% or fat-free milk. · Dip raw vegetables in a vinaigrette dressing or hummus instead of dips made from mayonnaise or sour cream.  · Have a piece of fruit for dessert instead of a piece of cake. Try baked apples, or have some dried fruit. Tips for eating out  · Try broiled, grilled, baked, or poached fish instead of having it fried or breaded. · Ask your  to have your meals prepared with olive oil instead of butter. · Order dishes made with marinara sauce or sauces made from olive oil. Avoid sauces made from cream or mayonnaise. · Choose whole-grain breads, whole wheat pasta and pizza crust, brown rice, beans, and lentils. · Cut back on butter or margarine on bread. Instead, you can dip your bread in a small amount of olive oil. · Ask for a side salad or grilled vegetables instead of french fries or chips. Where can you learn more? Go to http://www.[employer].com/  Enter O407 in the search box to learn more about \"Learning About the Mediterranean Diet. \"  Current as of: December 17, 2020               Content Version: 13.0  © 6962-5928 [employer].    Care instructions adapted under license by [employer] (which disclaims liability or warranty for this information). If you have questions about a medical condition or this instruction, always ask your healthcare professional. [place] [address] any warranty or liability for your use of this information.

## 2022-02-10 NOTE — PROGRESS NOTES
1. Have you been to the ER, urgent care clinic since your last visit? Hospitalized since your last visit? No    2. Have you seen or consulted any other health care providers outside of the [address] [place] [address] since your last visit? Include any pap smears or colon screening. No    3. Since your last visit, have you had any of the following symptoms? chest pains and shortness of breath. 4.  Have you had any blood work, X-rays or cardiac testing? Yes When: January 2021 Where: [facility] Reason for visit: Routine     Requested: NO     In [facility]: YES    5. Where do you normally have your labs drawn? [facility]    6. Do you need any refills today?    No